# Patient Record
Sex: MALE | Race: WHITE | NOT HISPANIC OR LATINO | Employment: OTHER | URBAN - METROPOLITAN AREA
[De-identification: names, ages, dates, MRNs, and addresses within clinical notes are randomized per-mention and may not be internally consistent; named-entity substitution may affect disease eponyms.]

---

## 2017-01-04 ENCOUNTER — PATIENT MESSAGE (OUTPATIENT)
Dept: FAMILY MEDICINE | Facility: CLINIC | Age: 42
End: 2017-01-04

## 2017-01-04 ENCOUNTER — TELEPHONE (OUTPATIENT)
Dept: FAMILY MEDICINE | Facility: CLINIC | Age: 42
End: 2017-01-04

## 2017-01-11 ENCOUNTER — TELEPHONE (OUTPATIENT)
Dept: FAMILY MEDICINE | Facility: CLINIC | Age: 42
End: 2017-01-11

## 2017-01-11 NOTE — TELEPHONE ENCOUNTER
----- Message from Adin Ramos sent at 1/11/2017  2:51 PM CST -----  Contact: same  Patient called in and stated his wife just had a baby at [facility] and was told he needed to get a TDAP asap and wanted to see if he could come in and get that done tomorrow 1/12/17.    Patient call back number [phone]

## 2017-01-16 ENCOUNTER — PATIENT MESSAGE (OUTPATIENT)
Dept: FAMILY MEDICINE | Facility: CLINIC | Age: 42
End: 2017-01-16

## 2017-07-17 ENCOUNTER — DOCUMENTATION ONLY (OUTPATIENT)
Dept: FAMILY MEDICINE | Facility: CLINIC | Age: 42
End: 2017-07-17

## 2017-07-17 NOTE — PROGRESS NOTES
Pre-Visit Chart Review  For Appointment Scheduled on 7/19/17    Health Maintenance Due   Topic Date Due    TETANUS VACCINE  12/08/1993

## 2017-07-19 ENCOUNTER — OFFICE VISIT (OUTPATIENT)
Dept: FAMILY MEDICINE | Facility: CLINIC | Age: 42
End: 2017-07-19
Payer: COMMERCIAL

## 2017-07-19 VITALS
HEIGHT: 73 IN | BODY MASS INDEX: 23.84 KG/M2 | HEART RATE: 59 BPM | SYSTOLIC BLOOD PRESSURE: 114 MMHG | WEIGHT: 179.88 LBS | DIASTOLIC BLOOD PRESSURE: 72 MMHG | TEMPERATURE: 98 F

## 2017-07-19 DIAGNOSIS — Z00.00 ANNUAL PHYSICAL EXAM: Primary | ICD-10-CM

## 2017-07-19 DIAGNOSIS — R53.83 FATIGUE, UNSPECIFIED TYPE: ICD-10-CM

## 2017-07-19 DIAGNOSIS — R06.83 SNORING: ICD-10-CM

## 2017-07-19 DIAGNOSIS — L82.1 SK (SEBORRHEIC KERATOSIS): ICD-10-CM

## 2017-07-19 DIAGNOSIS — G47.00 INSOMNIA, UNSPECIFIED TYPE: ICD-10-CM

## 2017-07-19 DIAGNOSIS — Z91.030 ALLERGY TO BEE STING: ICD-10-CM

## 2017-07-19 DIAGNOSIS — J31.0 CHRONIC RHINITIS, UNSPECIFIED TYPE: ICD-10-CM

## 2017-07-19 PROCEDURE — 99999 PR PBB SHADOW E&M-EST. PATIENT-LVL IV: CPT | Mod: PBBFAC,,, | Performed by: NURSE PRACTITIONER

## 2017-07-19 PROCEDURE — 99396 PREV VISIT EST AGE 40-64: CPT | Mod: S$GLB,,, | Performed by: NURSE PRACTITIONER

## 2017-07-19 RX ORDER — EPINEPHRINE 0.3 MG/.3ML
1 INJECTION SUBCUTANEOUS ONCE
Qty: 0.3 ML | Refills: 3 | Status: SHIPPED | OUTPATIENT
Start: 2017-07-19 | End: 2017-07-19

## 2017-07-19 RX ORDER — FLUTICASONE PROPIONATE 50 MCG
2 SPRAY, SUSPENSION (ML) NASAL DAILY
Qty: 3 BOTTLE | Refills: 3 | Status: SHIPPED | OUTPATIENT
Start: 2017-07-19

## 2017-07-19 RX ORDER — TRAZODONE HYDROCHLORIDE 50 MG/1
50 TABLET ORAL NIGHTLY
Qty: 30 TABLET | Refills: 11 | Status: SHIPPED | OUTPATIENT
Start: 2017-07-19 | End: 2018-07-20 | Stop reason: SDUPTHER

## 2017-07-19 RX ORDER — MONTELUKAST SODIUM 10 MG/1
10 TABLET ORAL NIGHTLY
Qty: 90 TABLET | Refills: 3 | Status: SHIPPED | OUTPATIENT
Start: 2017-07-19

## 2017-07-19 NOTE — PROGRESS NOTES
Subjective:       Patient ID: Jaime Mcpherson is a 41 y.o. male.    Chief Complaint: Annual Exam    Mr. Mcpherson presents to the clinic today for annual physical.  He has history of snoring and has been taking Flonase and Singulair with some relief.  He states he has had witnessed apnea in the past but none recently.  He is tired during the day.  He also has trouble falling asleep at night.  Denies having lights or TV on at night, no screen time right before bed.  Benadryl helps but gives him a hangover.  He requests a prescription for sleep.  He has cut back caffeine to two cups daily and GERD symptoms have improved.  He has a bee allergy and has an epi pen but it is .  He requests a referral to Allergy.  He also is concerned about a skin lesion to the right arm. The lesion has become smaller since he first noticed it two years ago, and it is not irritated.  He exercises three times per week lifting weights and cardio.  He states he attempts to eat a healthy diet.  He smokes a couple cigarettes per day and plans to quit.  He declines nicotine patches/gum. Has tried Chantix but stopped due to side effects.      Review of Systems   Constitutional: Positive for fatigue (+ daytime naps). Negative for chills and fever.   HENT: Negative for congestion, ear pain and sinus pressure.    Respiratory: Negative for cough, shortness of breath and wheezing.         Snoring   Cardiovascular: Negative for chest pain, palpitations and leg swelling.   Gastrointestinal: Negative for abdominal pain, constipation and diarrhea.   Skin:        Right arm skin lesion       Objective:      Physical Exam   Constitutional: He is oriented to person, place, and time. He appears well-developed and well-nourished. No distress.   HENT:   Head: Normocephalic and atraumatic.   Right Ear: External ear normal.   Left Ear: External ear normal.   Mouth/Throat: Oropharynx is clear and moist. No oropharyngeal exudate.   Eyes: Pupils are equal,  round, and reactive to light. Right eye exhibits no discharge. Left eye exhibits no discharge.   Neck: Neck supple. No thyromegaly present.   Cardiovascular: Normal rate and regular rhythm.  Exam reveals no gallop and no friction rub.    No murmur heard.  Pulmonary/Chest: Effort normal and breath sounds normal. No respiratory distress. He has no wheezes. He has no rales.   Abdominal: Soft. He exhibits no distension. There is no tenderness.   Lymphadenopathy:     He has no cervical adenopathy.   Neurological: He is alert and oriented to person, place, and time. Coordination normal.   Skin: Skin is warm and dry.        Psychiatric: He has a normal mood and affect. His behavior is normal. Thought content normal.   Vitals reviewed.          Current Outpatient Prescriptions:     fluticasone (FLONASE) 50 mcg/actuation nasal spray, 2 sprays by Each Nare route once daily., Disp: 3 Bottle, Rfl: 3    ibuprofen (ADVIL,MOTRIN) 200 MG tablet, Take 400 mg by mouth 2 (two) times daily as needed for Pain., Disp: , Rfl:     montelukast (SINGULAIR) 10 mg tablet, Take 1 tablet (10 mg total) by mouth nightly., Disp: 90 tablet, Rfl: 3    epinephrine (EPIPEN 2-HANNAH) 0.3 mg/0.3 mL AtIn, Inject 0.3 mLs (0.3 mg total) into the muscle once., Disp: 0.3 mL, Rfl: 3    trazodone (DESYREL) 50 MG tablet, Take 1 tablet (50 mg total) by mouth every evening., Disp: 30 tablet, Rfl: 11  Assessment:       1. Annual physical exam    2. Chronic rhinitis, unspecified type    3. Snoring    4. Fatigue, unspecified type    5. Insomnia, unspecified type    6. Allergy to bee sting    7. SK (seborrheic keratosis)        Plan:       Annual physical exam  Continue current diet and exercise.  -     Comprehensive metabolic panel; Future; Expected date: 07/19/2017  -     CBC auto differential; Future; Expected date: 07/19/2017  -     Lipid panel; Future; Expected date: 07/19/2017    Chronic rhinitis, unspecified type  -     montelukast (SINGULAIR) 10 mg tablet;  Take 1 tablet (10 mg total) by mouth nightly.  Dispense: 90 tablet; Refill: 3  -     fluticasone (FLONASE) 50 mcg/actuation nasal spray; 2 sprays by Each Nare route once daily.  Dispense: 3 Bottle; Refill: 3    Snoring  -     Ambulatory consult to Sleep Disorders    Fatigue, unspecified type  -     Ambulatory consult to Sleep Disorders    Insomnia, unspecified type  No screen time 30 mins before bed.  Use bed only for sleep and sex.   Avoid alcohol.  Do not exercise at night.  -     trazodone (DESYREL) 50 MG tablet; Take 1 tablet (50 mg total) by mouth every evening.  Dispense: 30 tablet; Refill: 11    Allergy to bee sting  -     Ambulatory referral to Allergy  -     epinephrine (EPIPEN 2-HANNAH) 0.3 mg/0.3 mL AtIn; Inject 0.3 mLs (0.3 mg total) into the muscle once.  Dispense: 0.3 mL; Refill: 3    SK (seborrheic keratosis)  Benign appearing.  Notify provider if lesion becomes irritated or grows.    Educated about ABCDE's of moles.   Daily sunscreen.

## 2017-07-19 NOTE — PATIENT INSTRUCTIONS
The Benefits of Living Smoke Free  What do you want to gain from quitting? Check off some reasons to quit.  Health benefits  ___  Improve my ability to breathe without coughing or shortness of breath  ___  Reduce my risk of lung cancer, heart disease, chronic lung disease  ___  Have fewer wrinkles and softer skin  ___  Improve my sense of taste and smell  ___  For pregnant women--reduce the risk of having a miscarriage, stillbirth, premature birth, or low-birth-weight baby  Personal benefits  ___  Feel more in control of my life  ___  Have better-smelling hair, breath, clothes, home, and car  ___  Save time by not having to take smoke breaks, buy cigarettes, or hunt for a light  ___  Have whiter teeth  Family benefits  ___  Reduce my childrens respiratory tract infections  ___  Set a good example for my children  ___  Reduce my familys cancer risk  Financial benefits  ___  Save hundreds of dollars each year that would be spent on cigarettes  ___  Save money on medical bills  ___  Save on life, health, and car insurance premiums     Those dollars add up!  Cigarettes are expensive, and getting more expensive all the time. Do you realize how much money you are spending on cigarettes per year? What is the average amount you spend on a pack of cigarettes? What is the average number of packs that you smoke per day? Using your answers to these questions, fill in this formula to help you find out:  ($ _____ per pack) ×  ( _____ number of packs per day) × (365 days) =  $ _____ yearly cost of smoking  Besides tobacco, there are other costs, including extra cleaning bills and replacement costs for clothing and furniture; medical expenses for smoking-related illnesses; and higher health, life, and car insurance premiums.  Cigars and pipes count too!  Cigars and pipes are also dangerous. So are smokeless (chewing) tobacco and snuff. All of these products contain nicotine, a highly addictive substance that has harmful effects  on your body. Quitting smoking means giving up all tobacco products.      For more information  · smokefree.gov/liwy-ji-nl-expert  · National [employer] [place] Smoking Quitline: 877-44U-QUIT (630-035-8889)   Date Last Reviewed: 11/18/2014  © 7941-0158 [employer]. [address] [place] [address], [place] [address]. All rights reserved. This information is not intended as a substitute for professional medical care. Always follow your healthcare professional's instructions.

## 2017-07-21 ENCOUNTER — LAB VISIT (OUTPATIENT)
Dept: LAB | Facility: HOSPITAL | Age: 42
End: 2017-07-21
Attending: NURSE PRACTITIONER
Payer: COMMERCIAL

## 2017-07-21 DIAGNOSIS — Z00.00 ANNUAL PHYSICAL EXAM: ICD-10-CM

## 2017-07-21 LAB
ALBUMIN SERPL BCP-MCNC: 3.8 G/DL
ALP SERPL-CCNC: 74 U/L
ALT SERPL W/O P-5'-P-CCNC: 8 U/L
ANION GAP SERPL CALC-SCNC: 11 MMOL/L
AST SERPL-CCNC: 16 U/L
BASOPHILS # BLD AUTO: 0.02 K/UL
BASOPHILS NFR BLD: 0.3 %
BILIRUB SERPL-MCNC: 0.4 MG/DL
BUN SERPL-MCNC: 13 MG/DL
CALCIUM SERPL-MCNC: 9.5 MG/DL
CHLORIDE SERPL-SCNC: 104 MMOL/L
CHOLEST/HDLC SERPL: 3.3 {RATIO}
CO2 SERPL-SCNC: 23 MMOL/L
CREAT SERPL-MCNC: 0.9 MG/DL
DIFFERENTIAL METHOD: ABNORMAL
EOSINOPHIL # BLD AUTO: 0.1 K/UL
EOSINOPHIL NFR BLD: 0.9 %
ERYTHROCYTE [DISTWIDTH] IN BLOOD BY AUTOMATED COUNT: 13.3 %
EST. GFR  (AFRICAN AMERICAN): >60 ML/MIN/1.73 M^2
EST. GFR  (NON AFRICAN AMERICAN): >60 ML/MIN/1.73 M^2
GLUCOSE SERPL-MCNC: 102 MG/DL
HCT VFR BLD AUTO: 43.7 %
HDL/CHOLESTEROL RATIO: 30.2 %
HDLC SERPL-MCNC: 129 MG/DL
HDLC SERPL-MCNC: 39 MG/DL
HGB BLD-MCNC: 14.9 G/DL
LDLC SERPL CALC-MCNC: 76 MG/DL
LYMPHOCYTES # BLD AUTO: 2.3 K/UL
LYMPHOCYTES NFR BLD: 33.8 %
MCH RBC QN AUTO: 31 PG
MCHC RBC AUTO-ENTMCNC: 34.1 G/DL
MCV RBC AUTO: 91 FL
MONOCYTES # BLD AUTO: 0.5 K/UL
MONOCYTES NFR BLD: 6.7 %
NEUTROPHILS # BLD AUTO: 4 K/UL
NEUTROPHILS NFR BLD: 58 %
NONHDLC SERPL-MCNC: 90 MG/DL
PLATELET # BLD AUTO: 247 K/UL
PMV BLD AUTO: 9.1 FL
POTASSIUM SERPL-SCNC: 4.3 MMOL/L
PROT SERPL-MCNC: 7.1 G/DL
RBC # BLD AUTO: 4.8 M/UL
SODIUM SERPL-SCNC: 138 MMOL/L
TRIGL SERPL-MCNC: 70 MG/DL
WBC # BLD AUTO: 6.89 K/UL

## 2017-07-21 PROCEDURE — 36415 COLL VENOUS BLD VENIPUNCTURE: CPT | Mod: PO

## 2017-07-21 PROCEDURE — 80053 COMPREHEN METABOLIC PANEL: CPT

## 2017-07-21 PROCEDURE — 85025 COMPLETE CBC W/AUTO DIFF WBC: CPT

## 2017-07-21 PROCEDURE — 80061 LIPID PANEL: CPT

## 2017-07-27 ENCOUNTER — OFFICE VISIT (OUTPATIENT)
Dept: ALLERGY | Facility: CLINIC | Age: 42
End: 2017-07-27
Payer: COMMERCIAL

## 2017-07-27 ENCOUNTER — LAB VISIT (OUTPATIENT)
Dept: LAB | Facility: HOSPITAL | Age: 42
End: 2017-07-27
Attending: ALLERGY & IMMUNOLOGY
Payer: COMMERCIAL

## 2017-07-27 VITALS
DIASTOLIC BLOOD PRESSURE: 70 MMHG | BODY MASS INDEX: 23.03 KG/M2 | HEIGHT: 73 IN | SYSTOLIC BLOOD PRESSURE: 108 MMHG | HEART RATE: 66 BPM | WEIGHT: 173.75 LBS | OXYGEN SATURATION: 98 %

## 2017-07-27 DIAGNOSIS — Z91.038 ALLERGY TO INSECT STINGS: ICD-10-CM

## 2017-07-27 DIAGNOSIS — T78.2XXA ANAPHYLAXIS, INITIAL ENCOUNTER: ICD-10-CM

## 2017-07-27 DIAGNOSIS — J31.0 OTHER CHRONIC RHINITIS: ICD-10-CM

## 2017-07-27 DIAGNOSIS — L29.9 PRURITUS: ICD-10-CM

## 2017-07-27 DIAGNOSIS — H10.423 SIMPLE CHRONIC CONJUNCTIVITIS OF BOTH EYES: ICD-10-CM

## 2017-07-27 DIAGNOSIS — T78.2XXA ANAPHYLAXIS, INITIAL ENCOUNTER: Primary | ICD-10-CM

## 2017-07-27 PROCEDURE — 86003 ALLG SPEC IGE CRUDE XTRC EA: CPT | Mod: 59

## 2017-07-27 PROCEDURE — 86003 ALLG SPEC IGE CRUDE XTRC EA: CPT

## 2017-07-27 PROCEDURE — 99244 OFF/OP CNSLTJ NEW/EST MOD 40: CPT | Mod: S$GLB,,, | Performed by: ALLERGY & IMMUNOLOGY

## 2017-07-27 PROCEDURE — 99999 PR PBB SHADOW E&M-EST. PATIENT-LVL III: CPT | Mod: PBBFAC,,, | Performed by: ALLERGY & IMMUNOLOGY

## 2017-07-27 PROCEDURE — 36415 COLL VENOUS BLD VENIPUNCTURE: CPT | Mod: PO

## 2017-07-27 RX ORDER — EPINEPHRINE 0.3 MG/.3ML
1 INJECTION SUBCUTANEOUS ONCE
Qty: 2 DEVICE | Refills: 3 | Status: SHIPPED | OUTPATIENT
Start: 2017-07-27 | End: 2019-10-08

## 2017-07-27 NOTE — PROGRESS NOTES
Subjective:       Patient ID: Jaime Mcpherson is a 41 y.o. male.    Chief Complaint:  Allergic Reaction (very allergic to bees and wasp, carries epi. )      42 yo man presents for consult from RASHAD Garcia for insect allergy. He states he started to have reactions about 2010. First time was bad an ended up hospitalized. Second also bad because did not have EpiPen yet. Since then he gets stung about 1-2 tomes per year and uses Epipen and has to go to ER. He gets stung anywhere and has facial swelling, eye swell shut, itching and some hives and SOB. Epi does help but at least once reaction came back while he was driving to hospital and he had to pull over and call for help. Very scary to him. Always goes to ER and gets steroids and benadryl as well. Resolves within a day. Most have been arm or leg stings. He has never known about allergy shots until last week. He has no asthma or eczema. No known food or latex allergy. He does get sneeze, runny nose and stuffy nose off and on. occ gets itchy hands and feet and itchy eyes and throat. He take Singulair and Flonase olsen for snoring. No season is worse. No time of day is worse. No known environment allergens. He has no other medical issues. No surgeries.         Environmental History: see history section for home environment  Review of Systems   Constitutional: Negative for activity change, appetite change, chills, fatigue, fever and unexpected weight change.   HENT: Positive for congestion, facial swelling, rhinorrhea, sneezing and sore throat. Negative for ear discharge, ear pain, hearing loss, mouth sores, nosebleeds, postnasal drip, sinus pressure, tinnitus, trouble swallowing and voice change.    Eyes: Positive for discharge and itching. Negative for redness and visual disturbance.   Respiratory: Positive for shortness of breath. Negative for cough, chest tightness and wheezing.    Cardiovascular: Negative for chest pain, palpitations and leg swelling.    Gastrointestinal: Negative for abdominal distention, abdominal pain, constipation, diarrhea, nausea and vomiting.   Genitourinary: Negative for difficulty urinating.   Musculoskeletal: Negative for arthralgias, back pain, joint swelling and myalgias.   Skin: Positive for rash. Negative for color change and pallor.   Neurological: Negative for dizziness, tremors, speech difficulty, weakness, light-headedness and headaches.   Hematological: Negative for adenopathy. Does not bruise/bleed easily.   Psychiatric/Behavioral: Negative for agitation, confusion, decreased concentration and sleep disturbance. The patient is not nervous/anxious.         Objective:    Physical Exam   Constitutional: He is oriented to person, place, and time. He appears well-developed and well-nourished. No distress.   HENT:   Head: Normocephalic and atraumatic.   Right Ear: Hearing, tympanic membrane, external ear and ear canal normal.   Left Ear: Hearing, tympanic membrane, external ear and ear canal normal.   Nose: No mucosal edema (pink turbinates), rhinorrhea, sinus tenderness or septal deviation. No epistaxis.   Mouth/Throat: Oropharynx is clear and moist and mucous membranes are normal. No uvula swelling.   Eyes: Conjunctivae are normal. Right eye exhibits no discharge. Left eye exhibits no discharge.   Neck: Normal range of motion. No thyromegaly present.   Cardiovascular: Normal rate, regular rhythm and normal heart sounds.    No murmur heard.  Pulmonary/Chest: Effort normal and breath sounds normal. No respiratory distress. He has no wheezes.   Abdominal: Soft. He exhibits no distension. There is no tenderness.   Musculoskeletal: Normal range of motion. He exhibits no edema.   Lymphadenopathy:     He has no cervical adenopathy.   Neurological: He is alert and oriented to person, place, and time. Coordination normal.   Skin: Skin is warm and dry. No rash noted. No erythema.   Psychiatric: He has a normal mood and affect. His behavior  is normal. Judgment and thought content normal.   Nursing note and vitals reviewed.      Laboratory:   none performed   Assessment:       1. Anaphylaxis, initial encounter    2. Allergy to insect stings    3. Other chronic rhinitis    4. Simple chronic conjunctivitis of both eyes    5. Pruritus         Plan:       1. Labs today for immunocaps for insect and inhalants  2. discussed insect allergy with pt and he is c/w anaphylaxis from insect sting, david wait immunocaps if negative needs skin test if positive madrigal tart IT. Patient interested in allergy shots.  All risks and benefits discussed.  Protocol discussed in detail including need to remain in clinic for 30 minutes after injections.  All questions answered, handouts with details of allergy shots provided and informed consent signed and witnessed.  Patient advised to remain off beta blockers while on allergy shots and to notify injection clinic and MD of any new medications starts.  3. continue Flonase and montelukast daily for rhinitis and can adjust with immunocaps results  4. Phone review  5. NP Radha notified of completed consult via [employer]

## 2017-07-27 NOTE — LETTER
July 27, 2017      Mary Garcia, FNP-C  [address]  [place] [address]           [place] [facility]  [address]  [place] [address]  Phone: [phone]          Patient: Jaime Mcpherson   MR Number: [id number]   YOB: 1975   Date of Visit: 7/27/2017       Dear Mary Garcia:    Thank you for referring Jaime Mcpherson to me for evaluation. Attached you will find relevant portions of my assessment and plan of care.    If you have questions, please do not hesitate to call me. I look forward to following Jaime Mcpherson along with you.    Sincerely,    Kavita Issa MD    Enclosure  CC:  No Recipients    If you would like to receive this communication electronically, please contact [email or web address] or [phone] to request more information on [employer] Link access.    For providers and/or their staff who would like to refer a patient to [facility], please contact us through our one-stop-shop provider referral line, [facility], at [phone].    If you feel you have received this communication in error or would no longer like to receive these types of communications, please e-mail [email or web address]

## 2017-07-31 LAB
A ALTERNATA IGE QN: <0.35 KU/L
A FUMIGATUS IGE QN: <0.35 KU/L
ALLERGEN COMMON WASP (YELLOW JACKET) IGE: <0.35 KU/L
ALLERGEN PENICILLIUM IGE: <0.35 KU/L
ALLERGEN WHITE FACED HORNET IGE: <0.35 KU/L
ALLERGEN WILLOW IGE: <0.35 KU/L
ALLERGEN YELLOW HORNET IGE: <0.35 KU/L
BAHIA GRASS IGE QN: <0.35 KU/L
BALD CYPRESS IGE QN: <0.35 KU/L
BERMUDA GRASS IGE QN: <0.35 KU/L
CAT DANDER IGE QN: <0.35 KU/L
COMMON RAGWEED IGE QN: <0.35 KU/L
D FARINAE IGE QN: <0.35 KU/L
D PTERONYSS IGE QN: <0.35 KU/L
DEPRECATED A ALTERNATA IGE RAST QL: NORMAL
DEPRECATED A FUMIGATUS IGE RAST QL: NORMAL
DEPRECATED BAHIA GRASS IGE RAST QL: NORMAL
DEPRECATED BALD CYPRESS IGE RAST QL: NORMAL
DEPRECATED BERMUDA GRASS IGE RAST QL: NORMAL
DEPRECATED CAT DANDER IGE RAST QL: NORMAL
DEPRECATED COMMON RAGWEED IGE RAST QL: NORMAL
DEPRECATED D FARINAE IGE RAST QL: NORMAL
DEPRECATED D PTERONYSS IGE RAST QL: NORMAL
DEPRECATED DOG DANDER IGE RAST QL: NORMAL
DEPRECATED ENGL PLANTAIN IGE RAST QL: NORMAL
DEPRECATED HONEY BEE IGE RAST QL: NORMAL
DEPRECATED JOHNSON GRASS IGE RAST QL: NORMAL
DEPRECATED MARSH ELDER IGE RAST QL: NORMAL
DEPRECATED PECAN/HICK TREE IGE RAST QL: NORMAL
DEPRECATED RED IMP FIRE ANT IGE RAST QL: NORMAL
DEPRECATED ROACH IGE RAST QL: NORMAL
DEPRECATED S ROSTRATA IGE RAST QL: NORMAL
DEPRECATED SALTWORT IGE RAST QL: NORMAL
DEPRECATED SILVER BIRCH IGE RAST QL: NORMAL
DEPRECATED TIMOTHY IGE RAST QL: NORMAL
DEPRECATED WHITE OAK IGE RAST QL: NORMAL
DOG DANDER IGE QN: <0.35 KU/L
ENGL PLANTAIN IGE QN: <0.35 KU/L
HONEY BEE IGE QN: <0.35 KU/L
JOHNSON GRASS IGE QN: <0.35 KU/L
MARSH ELDER IGE QN: <0.35 KU/L
PECAN/HICK TREE IGE QN: <0.35 KU/L
PENICILLIUM CLASS: NORMAL
RED IMP FIRE ANT IGE QN: <0.35 KU/L
ROACH IGE QN: <0.35 KU/L
S ROSTRATA IGE QN: <0.35 KU/L
SALTWORT IGE QN: <0.35 KU/L
SILVER BIRCH IGE QN: <0.35 KU/L
TIMOTHY IGE QN: <0.35 KU/L
WASP CLASS: ABNORMAL
WASP VENOM IGE: 0.51 KU/L
WHITE FACED HORNET CLASS: NORMAL
WHITE OAK IGE QN: <0.35 KU/L
WILLOW CLASS: NORMAL
YEL FACED HORN. CLASS: NORMAL
YELLOW JACKET CLASS: NORMAL

## 2017-08-01 ENCOUNTER — TELEPHONE (OUTPATIENT)
Dept: ALLERGY | Facility: CLINIC | Age: 42
End: 2017-08-01

## 2017-08-01 NOTE — TELEPHONE ENCOUNTER
Please let him know that the only thing that come up positive on allergy test is wasp. Rest of insects and inhalants negative. Wasp may be the culprit for all his reactions but I think it is best if we scheduled for skin test to rest of insects to be sure before starting shots. This is a long process. It starts with scratch test and if negative have to go through series of intradermals. So has to be scheduled as 90 minute appointment. Last time I can start is 10 in AM or 2 in afternoon. Schedule at preferred location but will need to have testers sent from [facility] to which ever location

## 2017-08-11 ENCOUNTER — TELEPHONE (OUTPATIENT)
Dept: ALLERGY | Facility: CLINIC | Age: 42
End: 2017-08-11

## 2018-07-20 ENCOUNTER — TELEPHONE (OUTPATIENT)
Dept: FAMILY MEDICINE | Facility: CLINIC | Age: 43
End: 2018-07-20

## 2018-07-20 DIAGNOSIS — G47.00 INSOMNIA, UNSPECIFIED TYPE: ICD-10-CM

## 2018-07-20 RX ORDER — TRAZODONE HYDROCHLORIDE 50 MG/1
TABLET ORAL
Qty: 30 TABLET | Refills: 0 | Status: SHIPPED | OUTPATIENT
Start: 2018-07-20 | End: 2019-10-08

## 2018-07-20 NOTE — TELEPHONE ENCOUNTER
----- Message from Yasmin Fu sent at 7/20/2018  3:20 PM CDT -----  Type:  RX Refill Request    Who Called:  Patient  RX Name and Strength:  trazodone (DESYREL) 50 MG tablet  Preferred Pharmacy with phone number: [facility] in [place] at [phone] (Phone)  Local or Mail Order:  Local  Ordering Provider:  Mary Garcia  [facility] Call Back Number:  [phone]  Additional Information:

## 2018-07-30 ENCOUNTER — TELEPHONE (OUTPATIENT)
Dept: FAMILY MEDICINE | Facility: CLINIC | Age: 43
End: 2018-07-30

## 2018-07-30 NOTE — TELEPHONE ENCOUNTER
Left detailed voicemail instructing patient that he must have an appointment prior to further refills of medication.

## 2018-07-30 NOTE — TELEPHONE ENCOUNTER
----- Message from Barrett Ordonez sent at 7/30/2018  3:56 PM CDT -----  Contact: pt  Pt is returning call, call placed to pod no answer   Call Back#[phone]  Thanks

## 2018-08-18 DIAGNOSIS — G47.00 INSOMNIA, UNSPECIFIED TYPE: ICD-10-CM

## 2018-08-19 RX ORDER — TRAZODONE HYDROCHLORIDE 50 MG/1
TABLET ORAL
Qty: 30 TABLET | Refills: 0 | OUTPATIENT
Start: 2018-08-19

## 2019-10-08 ENCOUNTER — HOSPITAL ENCOUNTER (EMERGENCY)
Facility: HOSPITAL | Age: 44
Discharge: HOME | End: 2019-10-08
Attending: INTERNAL MEDICINE
Payer: COMMERCIAL

## 2019-10-08 VITALS
OXYGEN SATURATION: 98 % | DIASTOLIC BLOOD PRESSURE: 83 MMHG | HEART RATE: 72 BPM | BODY MASS INDEX: 23.06 KG/M2 | RESPIRATION RATE: 18 BRPM | HEIGHT: 73 IN | WEIGHT: 174 LBS | TEMPERATURE: 98 F | SYSTOLIC BLOOD PRESSURE: 141 MMHG

## 2019-10-08 DIAGNOSIS — T63.461A WASP STING, ACCIDENTAL OR UNINTENTIONAL, INITIAL ENCOUNTER: Primary | ICD-10-CM

## 2019-10-08 DIAGNOSIS — T78.40XA ALLERGIC REACTION, INITIAL ENCOUNTER: ICD-10-CM

## 2019-10-08 PROCEDURE — 63600175 PHARM REV CODE 636 W HCPCS: Performed by: PHYSICIAN ASSISTANT

## 2019-10-08 PROCEDURE — 99284 EMERGENCY DEPT VISIT MOD MDM: CPT | Mod: 25

## 2019-10-08 PROCEDURE — 96372 THER/PROPH/DIAG INJ SC/IM: CPT

## 2019-10-08 RX ORDER — METHYLPREDNISOLONE 4 MG/1
TABLET ORAL
Qty: 1 PACKAGE | Refills: 0 | Status: SHIPPED | OUTPATIENT
Start: 2019-10-08 | End: 2019-10-29

## 2019-10-08 RX ORDER — DIPHENHYDRAMINE HYDROCHLORIDE 50 MG/ML
50 INJECTION INTRAMUSCULAR; INTRAVENOUS
Status: COMPLETED | OUTPATIENT
Start: 2019-10-08 | End: 2019-10-08

## 2019-10-08 RX ORDER — METHYLPREDNISOLONE SOD SUCC 125 MG
125 VIAL (EA) INJECTION
Status: COMPLETED | OUTPATIENT
Start: 2019-10-08 | End: 2019-10-08

## 2019-10-08 RX ORDER — EPINEPHRINE 0.3 MG/.3ML
1 INJECTION SUBCUTANEOUS
Qty: 2 DEVICE | Refills: 0 | Status: SHIPPED | OUTPATIENT
Start: 2019-10-08 | End: 2020-10-07

## 2019-10-08 RX ADMIN — DIPHENHYDRAMINE HYDROCHLORIDE 50 MG: 50 INJECTION, SOLUTION INTRAMUSCULAR; INTRAVENOUS at 11:10

## 2019-10-08 RX ADMIN — METHYLPREDNISOLONE SODIUM SUCCINATE 125 MG: 125 INJECTION, POWDER, FOR SOLUTION INTRAMUSCULAR; INTRAVENOUS at 11:10

## 2019-10-08 NOTE — ED PROVIDER NOTES
Encounter Date: 10/8/2019       History   No chief complaint on file.    Patient presents to the ER with complaint of loss thing to left hand.  Patient states was stung twice and states he is allergic to wasps.  Patient states has taken 2 doses of his EpiPen stating prior to arrival he was feeling short of breath.  Patient denies feeling short of breath nausea vomiting or diarrhea.  Patient denies any feeling of swelling in his throat states has not taken any Benadryl prior to arrival in the ER.  Patient states has had similar symptoms in the past stated when he stung by a wasp he has swelling of his face and throat as well as rash. Patient denies any of the symptoms other than the swelling of his left hand on arrival in the ER.    The history is provided by the patient.     Review of patient's allergies indicates:  No Known Allergies  Past Medical History:   Diagnosis Date    Bee sting allergy     GERD (gastroesophageal reflux disease)      Past Surgical History:   Procedure Laterality Date    MOUTH SURGERY      WISDOM TOOTH EXTRACTION       Family History   Problem Relation Age of Onset    Cancer Mother 55        lung/smoker    Hypothyroidism Mother     Lupus Father     No Known Problems Brother     No Known Problems Daughter     No Known Problems Son     No Known Problems Paternal Uncle     Stroke Paternal Grandmother     No Known Problems Paternal Grandfather     Allergic rhinitis Neg Hx     Allergies Neg Hx     Angioedema Neg Hx     Asthma Neg Hx     Atopy Neg Hx     Eczema Neg Hx     Immunodeficiency Neg Hx     Rhinitis Neg Hx     Urticaria Neg Hx      Social History     Tobacco Use    Smoking status: Current Every Day Smoker     Types: Cigarettes     Start date: 1/19/2016    Smokeless tobacco: Never Used    Tobacco comment: electric / lives in [place] [phone]   Substance Use Topics    Alcohol use: Yes     Alcohol/week: 2.0 standard drinks     Types: 2 Standard drinks or equivalent  per week    Drug use: No     Review of Systems   Constitutional: Negative for fever.   HENT: Negative for congestion, dental problem, ear discharge, ear pain, facial swelling, nosebleeds, postnasal drip, rhinorrhea, sinus pressure, sneezing, sore throat, trouble swallowing and voice change.    Respiratory: Negative for apnea, cough, choking, chest tightness, shortness of breath, wheezing and stridor.    Cardiovascular: Negative for chest pain and palpitations.   Gastrointestinal: Negative for abdominal pain, diarrhea, nausea and vomiting.   Genitourinary: Negative for dysuria.   Musculoskeletal: Negative for back pain.   Skin: Positive for color change ( erythematic to left hand), rash ( left hand) and wound (Left hand).   Neurological: Negative for weakness.   Hematological: Does not bruise/bleed easily.   All other systems reviewed and are negative.      Physical Exam     Initial Vitals   BP Pulse Resp Temp SpO2   -- -- -- -- --      MAP       --         Physical Exam    Nursing note and vitals reviewed.  Constitutional: He appears well-developed and well-nourished. He is not diaphoretic. No distress.   HENT:   Head: Atraumatic.   Right Ear: External ear normal.   Left Ear: External ear normal.   Nose: Nose normal.   Mouth/Throat: Oropharynx is clear and moist. No oropharyngeal exudate.   Patient has braces   Eyes: Conjunctivae are normal. Right eye exhibits no discharge. Left eye exhibits no discharge.   Neck: Normal range of motion. Neck supple.   Cardiovascular: Normal rate, regular rhythm, normal heart sounds and intact distal pulses. Exam reveals no gallop and no friction rub.    No murmur heard.  Pulmonary/Chest: Breath sounds normal. No respiratory distress. He has no wheezes. He has no rhonchi. He has no rales. He exhibits no tenderness.   Abdominal: Soft. There is no tenderness.   Musculoskeletal: Normal range of motion. He exhibits edema ( left dorsal thumb surrounding the 2 puncture wounds). He  exhibits no tenderness.   Neurological: He is alert and oriented to person, place, and time. GCS score is 15. GCS eye subscore is 4. GCS verbal subscore is 5. GCS motor subscore is 6.   Skin: Skin is warm and dry. Capillary refill takes less than 2 seconds. No abscess noted. There is erythema (Left proximal hand about the dorsal thumb with to what appears to be puncture marks from the reported insect sting). No pallor.   Psychiatric: He has a normal mood and affect. Thought content normal.         ED Course   Procedures  Labs Reviewed - No data to display       Imaging Results    None          Medical Decision Making:   Differential Diagnosis:   Insect sting urticaria allergic reaction  ED Management:  Will order Benadryl and steroid and monitor the patient.    1220 on re-evaluation patient continues to deny trouble breathing or shortness of breath states hand does feel itchy denies nausea vomiting or diarrhea.  Lungs clear and equal bilaterally no wheezes rales or rhonchi.  Erythema to left hand continues to stay stable with no growth from initial evaluation.    1300 lungs clear and equal bilaterally rash continues to stay stable with no progression of the rash up the arm patient denies nausea vomiting diarrhea denies any trouble breathing or shortness of breath denies any swelling in throat.    Discussed return to ER precautions with the patient as well as continued management at home and return to ER precautions the patient stated that he understood he did not have any questions.    This note was created using [employer] voice recognition software that occasionally misinterpreted phrases or words.                      Clinical Impression:       ICD-10-CM ICD-9-CM   1. Wasp sting, accidental or unintentional, initial encounter T63.461A 989.5     E905.3   2. Allergic reaction, initial encounter T78.40XA 995.3                                GUILLE Malloy  10/08/19 1300       GUILLE Malloy  10/08/19  4424

## 2019-10-08 NOTE — ED TRIAGE NOTES
"Present to the ER with " stung by a swasp" reports insect sting approx 30mins ago, c/o " itchiness all over, my arm was burning" reports giving himself a epinephrine injection for the reported symptoms, denies tongue swelling, states " but I did fell my chest starting to constrict" denies SOB, denies throat swelling, reports relief of itching with epi injection, states" I fell ok, the itchiness is gone, now I fell the burning hot" localized to L hand  "

## 2019-10-08 NOTE — DISCHARGE INSTRUCTIONS
Continue over-the-counter Benadryl as directed on the bottle.  Be sure to take all steroids as prescribed.    If acute worsening of symptoms redeveloped shortness of breath or trouble breathing return to ER immediately for re-evaluation.    Your blood pressure was elevated on exam today please follow up with pcp for blood pressure management.

## 2019-10-08 NOTE — ED NOTES
Instructed to notify nurse stat if new symptoms, worsening symptoms appear, verbalized understanding

## 2023-02-17 ENCOUNTER — OFFICE VISIT (OUTPATIENT)
Dept: FAMILY MEDICINE | Facility: CLINIC | Age: 48
End: 2023-02-17
Payer: COMMERCIAL

## 2023-02-17 VITALS
RESPIRATION RATE: 20 BRPM | DIASTOLIC BLOOD PRESSURE: 82 MMHG | SYSTOLIC BLOOD PRESSURE: 131 MMHG | HEART RATE: 81 BPM | WEIGHT: 159.63 LBS | BODY MASS INDEX: 21.06 KG/M2 | OXYGEN SATURATION: 95 %

## 2023-02-17 DIAGNOSIS — F51.01 PRIMARY INSOMNIA: ICD-10-CM

## 2023-02-17 DIAGNOSIS — M77.01 MEDIAL EPICONDYLITIS OF RIGHT ELBOW: Primary | ICD-10-CM

## 2023-02-17 PROCEDURE — 3008F BODY MASS INDEX DOCD: CPT | Mod: S$GLB,,, | Performed by: FAMILY MEDICINE

## 2023-02-17 PROCEDURE — 99999 PR PBB SHADOW E&M-EST. PATIENT-LVL III: ICD-10-PCS | Mod: PBBFAC,,, | Performed by: FAMILY MEDICINE

## 2023-02-17 PROCEDURE — 3075F SYST BP GE 130 - 139MM HG: CPT | Mod: S$GLB,,, | Performed by: FAMILY MEDICINE

## 2023-02-17 PROCEDURE — 3008F PR BODY MASS INDEX (BMI) DOCUMENTED: ICD-10-PCS | Mod: S$GLB,,, | Performed by: FAMILY MEDICINE

## 2023-02-17 PROCEDURE — 99214 PR OFFICE/OUTPT VISIT, EST, LEVL IV, 30-39 MIN: ICD-10-PCS | Mod: S$GLB,,, | Performed by: FAMILY MEDICINE

## 2023-02-17 PROCEDURE — 3079F PR MOST RECENT DIASTOLIC BLOOD PRESSURE 80-89 MM HG: ICD-10-PCS | Mod: S$GLB,,, | Performed by: FAMILY MEDICINE

## 2023-02-17 PROCEDURE — 99999 PR PBB SHADOW E&M-EST. PATIENT-LVL III: CPT | Mod: PBBFAC,,, | Performed by: FAMILY MEDICINE

## 2023-02-17 PROCEDURE — 3075F PR MOST RECENT SYSTOLIC BLOOD PRESS GE 130-139MM HG: ICD-10-PCS | Mod: S$GLB,,, | Performed by: FAMILY MEDICINE

## 2023-02-17 PROCEDURE — 99214 OFFICE O/P EST MOD 30 MIN: CPT | Mod: S$GLB,,, | Performed by: FAMILY MEDICINE

## 2023-02-17 PROCEDURE — 3079F DIAST BP 80-89 MM HG: CPT | Mod: S$GLB,,, | Performed by: FAMILY MEDICINE

## 2023-02-17 RX ORDER — TRAZODONE HYDROCHLORIDE 100 MG/1
100 TABLET ORAL NIGHTLY
Qty: 30 TABLET | Refills: 5 | Status: SHIPPED | OUTPATIENT
Start: 2023-02-17 | End: 2023-08-30 | Stop reason: SDUPTHER

## 2023-02-17 NOTE — PROGRESS NOTES
[facility] - Clinic Note    Subjective      Mr. Mcpherson is a 47 y.o. male who presents to clinic with complaints of elbow pain.     Reports elbow pain on the right medial aspect.   Present for the past 3 years.   Having trouble with use and grasping.   Tried OTC meds with no improvement.   Denies trauma.     Trouble sleeping.   Reports a history of insomnia. Currently not on anything.   Has tried OTC meds with no relief.   Trouble going to sleep and staying asleep.    PM Jaiem has a past medical history of Bee sting allergy and GERD (gastroesophageal reflux disease).   PSXH Jaime has a past surgical history that includes Mouth surgery and [place] tooth extraction.    Jaime's family history includes Cancer (age of onset: 55) in his mother; Hypothyroidism in his mother; Lupus in his father; No Known Problems in his brother, daughter, paternal grandfather, paternal uncle, and son; Stroke in his paternal grandmother.    Jaime reports that he has been smoking cigarettes. He started smoking about 7 years ago. He has never used smokeless tobacco. He reports current alcohol use of about 2.0 standard drinks per week. He reports that he does not use drugs.   [facility] Jaime has No Known Allergies.   URMILA Sandoval has a current medication list which includes the following prescription(s): ibuprofen, epinephrine, fluticasone propionate, meloxicam, montelukast, and trazodone.     Review of Systems   Constitutional:  Negative for activity change, appetite change, chills, fatigue and fever.   Eyes:  Negative for visual disturbance.   Respiratory:  Negative for cough and shortness of breath.    Cardiovascular:  Negative for chest pain, palpitations and leg swelling.   Gastrointestinal:  Negative for abdominal pain, nausea and vomiting.   Musculoskeletal:  Positive for arthralgias.        Right elbow pain   Skin:  Negative for wound.   Neurological:  Negative for dizziness and headaches.    Psychiatric/Behavioral:  Positive for sleep disturbance. Negative for confusion.    Objective     /82 (BP Location: Left arm, Patient Position: Sitting, BP Method: Medium (Manual))   Pulse 81   Resp 20   Wt 72.4 kg (159 lb 9.8 oz)   SpO2 95%   BMI 21.06 kg/m²     Physical Exam   Constitutional: normal appearance.  Non-toxic appearance. No distress. He does not appear ill.   HENT:   Head: Normocephalic and atraumatic.   Eyes: Right eye exhibits no discharge. Left eye exhibits no discharge.   Cardiovascular: Normal rate, regular rhythm, normal heart sounds and normal pulses. Exam reveals no gallop and no friction rub.   No murmur heard.Pulmonary:      Effort: Pulmonary effort is normal. No respiratory distress.      Breath sounds: Normal breath sounds. No wheezing, rhonchi or rales.     Abdominal: Normal appearance.   Musculoskeletal:      Right lower leg: No edema.      Left lower leg: No edema.      Comments: +TTP over the medial epicondyle.    Lymphadenopathy:     He has no cervical adenopathy.   Neurological: He is alert.   Skin: Skin is warm and dry. Capillary refill takes less than 2 seconds. He is not diaphoretic.   Psychiatric: His behavior is normal. Mood, judgment and thought content normal.   Vitals reviewed.   Assessment/Plan     Jaime was seen today for elbow pain and sleep meds.    Diagnoses and all orders for this visit:  -New patient and new problem to me    Medial epicondylitis of right elbow  -     Ambulatory referral/consult to Orthopedics; Future    Primary insomnia  -     traZODone (DESYREL) 100 MG tablet; Take 1 tablet (100 mg total) by mouth every evening.        Follow up in about 6 months (around 8/17/2023), or if symptoms worsen or fail to improve.      Priscila Tena MD  Family Medicine  [facility]

## 2023-02-22 ENCOUNTER — PATIENT MESSAGE (OUTPATIENT)
Dept: ADMINISTRATIVE | Facility: HOSPITAL | Age: 48
End: 2023-02-22
Payer: COMMERCIAL

## 2023-02-22 DIAGNOSIS — Z11.59 NEED FOR HEPATITIS C SCREENING TEST: ICD-10-CM

## 2023-03-01 DIAGNOSIS — M25.521 RIGHT ELBOW PAIN: Primary | ICD-10-CM

## 2023-03-02 ENCOUNTER — HOSPITAL ENCOUNTER (OUTPATIENT)
Dept: RADIOLOGY | Facility: HOSPITAL | Age: 48
Discharge: HOME | End: 2023-03-02
Attending: ORTHOPAEDIC SURGERY
Payer: COMMERCIAL

## 2023-03-02 ENCOUNTER — OFFICE VISIT (OUTPATIENT)
Dept: ORTHOPEDICS | Facility: CLINIC | Age: 48
End: 2023-03-02
Payer: COMMERCIAL

## 2023-03-02 VITALS — WEIGHT: 159.63 LBS | RESPIRATION RATE: 16 BRPM | BODY MASS INDEX: 21.16 KG/M2 | HEIGHT: 73 IN

## 2023-03-02 DIAGNOSIS — M25.521 RIGHT ELBOW PAIN: ICD-10-CM

## 2023-03-02 DIAGNOSIS — M77.01 MEDIAL EPICONDYLITIS OF ELBOW, RIGHT: Primary | ICD-10-CM

## 2023-03-02 DIAGNOSIS — M77.01 MEDIAL EPICONDYLITIS OF RIGHT ELBOW: ICD-10-CM

## 2023-03-02 PROCEDURE — 1160F RVW MEDS BY RX/DR IN RCRD: CPT | Mod: S$GLB,,, | Performed by: ORTHOPAEDIC SURGERY

## 2023-03-02 PROCEDURE — 20551 TENDON ORIGIN: ICD-10-PCS | Mod: RT,S$GLB,, | Performed by: ORTHOPAEDIC SURGERY

## 2023-03-02 PROCEDURE — 3008F BODY MASS INDEX DOCD: CPT | Mod: S$GLB,,, | Performed by: ORTHOPAEDIC SURGERY

## 2023-03-02 PROCEDURE — 99204 PR OFFICE/OUTPT VISIT, NEW, LEVL IV, 45-59 MIN: ICD-10-PCS | Mod: 25,S$GLB,, | Performed by: ORTHOPAEDIC SURGERY

## 2023-03-02 PROCEDURE — 20551 NJX 1 TENDON ORIGIN/INSJ: CPT | Mod: RT,S$GLB,, | Performed by: ORTHOPAEDIC SURGERY

## 2023-03-02 PROCEDURE — 1159F PR MEDICATION LIST DOCUMENTED IN MEDICAL RECORD: ICD-10-PCS | Mod: S$GLB,,, | Performed by: ORTHOPAEDIC SURGERY

## 2023-03-02 PROCEDURE — 73080 X-RAY EXAM OF ELBOW: CPT | Mod: 26,RT,, | Performed by: RADIOLOGY

## 2023-03-02 PROCEDURE — 1160F PR REVIEW ALL MEDS BY PRESCRIBER/CLIN PHARMACIST DOCUMENTED: ICD-10-PCS | Mod: S$GLB,,, | Performed by: ORTHOPAEDIC SURGERY

## 2023-03-02 PROCEDURE — 73080 XR ELBOW COMPLETE 3 VIEW RIGHT: ICD-10-PCS | Mod: 26,RT,, | Performed by: RADIOLOGY

## 2023-03-02 PROCEDURE — 99204 OFFICE O/P NEW MOD 45 MIN: CPT | Mod: 25,S$GLB,, | Performed by: ORTHOPAEDIC SURGERY

## 2023-03-02 PROCEDURE — 99999 PR PBB SHADOW E&M-EST. PATIENT-LVL III: CPT | Mod: PBBFAC,,, | Performed by: ORTHOPAEDIC SURGERY

## 2023-03-02 PROCEDURE — 99999 PR PBB SHADOW E&M-EST. PATIENT-LVL III: ICD-10-PCS | Mod: PBBFAC,,, | Performed by: ORTHOPAEDIC SURGERY

## 2023-03-02 PROCEDURE — 73080 X-RAY EXAM OF ELBOW: CPT | Mod: TC,PN,RT

## 2023-03-02 PROCEDURE — 1159F MED LIST DOCD IN RCRD: CPT | Mod: S$GLB,,, | Performed by: ORTHOPAEDIC SURGERY

## 2023-03-02 PROCEDURE — 3008F PR BODY MASS INDEX (BMI) DOCUMENTED: ICD-10-PCS | Mod: S$GLB,,, | Performed by: ORTHOPAEDIC SURGERY

## 2023-03-02 RX ORDER — TRIAMCINOLONE ACETONIDE 40 MG/ML
40 INJECTION, SUSPENSION INTRA-ARTICULAR; INTRAMUSCULAR
Status: DISCONTINUED | OUTPATIENT
Start: 2023-03-02 | End: 2023-03-02 | Stop reason: HOSPADM

## 2023-03-02 RX ORDER — MELOXICAM 15 MG/1
15 TABLET ORAL DAILY
Qty: 30 TABLET | Refills: 1 | Status: SHIPPED | OUTPATIENT
Start: 2023-03-02

## 2023-03-02 RX ADMIN — TRIAMCINOLONE ACETONIDE 40 MG: 40 INJECTION, SUSPENSION INTRA-ARTICULAR; INTRAMUSCULAR at 08:03

## 2023-03-02 NOTE — PROCEDURES
Tendon Origin    Date/Time: 3/2/2023 8:00 AM  Performed by: Jaime Chang MD  Authorized by: Jaime Chang MD     Consent Done?:  Yes (Verbal)  Timeout: prior to procedure the correct patient, procedure, and site was verified    Indications:  Pain  Site marked: the procedure site was marked    Timeout: prior to procedure the correct patient, procedure, and site was verified    Location:  Elbow  Prep: patient was prepped and draped in usual sterile fashion    Ultrasonic Guidance for Needle Placement?: No    Needle size:  22 G  Approach:  Anteromedial  Medications:  40 mg triamcinolone acetonide 40 mg/mL  Patient tolerance:  Patient tolerated the procedure well with no immediate complications

## 2023-03-02 NOTE — PROGRESS NOTES
Subjective:      Patient ID: Jaime Mcpherson is a 47 y.o. male.    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  He is had some therapy for it in the past which gave him some initial relief.  He is complaining of pain with gripping grasping in any heavy repetitive use of his arm.  He is taken some recent over-the-counter medicines without much improvement.  He also intermittently has numbness and tingling and weakness or dead feeling particularly over the ulnar aspect of the hand and arm.  He has nighttime discomfort.  ROS      Objective:    Ortho Exam     Constitutional:   Patient is alert  and oriented in no acute distress  HEENT:  normocephalic atraumatic; PERRL EOMI  Neck:  Supple without adenopathy  Cardiovascular:  Normal rate and rhythm  Pulmonary:  Normal respiratory effort normal chest wall expansion  Abdominal:  Nonprotuberant nondistended  Musculoskeletal:  He has full elbow range of motion without any mass, swelling, or atrophy.  Diffuse moderate to severe tenderness over the medial epicondyle.  He has a positive Tinel's at the elbow  Positive Wartenberg sign.  Otherwise median ulnar and radial nerve function intact.  Subjective decreased sensation  Over the ulnar digits.  Intact two-point discrimination  Neurological:  No focal defect; cranial nerves 2-12 grossly intact  Psychiatric/behavioral:  Mood and behavior normal      No image results found.       My Findings:    Radiographs of the right elbow without acute osseous abnormalities.  Assessment:       Encounter Diagnoses   Name Primary?    Medial epicondylitis of right elbow     Medial epicondylitis of elbow, right Yes         Plan:       I have discussed medical condition and treatment options with him at length.  After verbal consent and sterile prep I injected over the point of maximum tenderness without complication using combination of cc of  Kenalog several cc of lidocaine.  I have suggested some rehab exercises NSAIDs.  GI cardiac and renal precautions were discussed.  He declines any formal therapy at this point.  I have discussed generalized activity restrictions and a nighttime splint or brace to avoid hyperflexion of his elbow.  Follow up with me in 4-6 weeks sooner if any questions or problems.        Past Medical History:   Diagnosis Date    Bee sting allergy     GERD (gastroesophageal reflux disease)      Past Surgical History:   Procedure Laterality Date    MOUTH SURGERY      WISDOM TOOTH EXTRACTION           Current Outpatient Medications:     EPINEPHrine (EPIPEN) 0.3 mg/0.3 mL AtIn, Inject 0.3 mLs (0.3 mg total) into the muscle as needed (Anaphylaxis)., Disp: 2 Device, Rfl: 0    fluticasone (FLONASE) 50 mcg/actuation nasal spray, 2 sprays by Each Nare route once daily. (Patient not taking: Reported on 2/17/2023), Disp: 3 Bottle, Rfl: 3    ibuprofen (ADVIL,MOTRIN) 200 MG tablet, Take 400 mg by mouth 2 (two) times daily as needed for Pain., Disp: , Rfl:     meloxicam (MOBIC) 15 MG tablet, Take 1 tablet (15 mg total) by mouth once daily., Disp: 30 tablet, Rfl: 1    montelukast (SINGULAIR) 10 mg tablet, Take 1 tablet (10 mg total) by mouth nightly. (Patient not taking: Reported on 2/17/2023), Disp: 90 tablet, Rfl: 3    traZODone (DESYREL) 100 MG tablet, Take 1 tablet (100 mg total) by mouth every evening., Disp: 30 tablet, Rfl: 5    Review of patient's allergies indicates:  No Known Allergies    Family History   Problem Relation Age of Onset    Cancer Mother 55        lung/smoker    Hypothyroidism Mother     Lupus Father     No Known Problems Brother     No Known Problems Daughter     No Known Problems Son     No Known Problems Paternal Uncle     Stroke Paternal Grandmother     No Known Problems Paternal Grandfather     Allergic rhinitis Neg Hx     Allergies Neg Hx     Angioedema Neg Hx     Asthma Neg Hx     Atopy Neg Hx     Eczema Neg Hx      Immunodeficiency Neg Hx     Rhinitis Neg Hx     Urticaria Neg Hx      Social History     Occupational History    Not on file   Tobacco Use    Smoking status: Every Day     Types: Cigarettes     Start date: 1/19/2016    Smokeless tobacco: Never    Tobacco comments:     electric / lives in [place] [phone]   Substance and Sexual Activity    Alcohol use: Yes     Alcohol/week: 2.0 standard drinks     Types: 2 Standard drinks or equivalent per week    Drug use: No    Sexual activity: Not on file

## 2023-08-30 DIAGNOSIS — F51.01 PRIMARY INSOMNIA: ICD-10-CM

## 2023-08-30 NOTE — TELEPHONE ENCOUNTER
----- Message from Oksana Blanco sent at 8/30/2023  1:03 PM CDT -----  Type:  RX Refill Request    Who Called: pt    Refill or New Rx:refill    RX Name and Strength:traZODone (DESYREL) 100 MG tablet    How is the patient currently taking it? (ex. 1XDay):1 at night before bed    Is this a 30 day or 90 day RX:90    Preferred Pharmacy with phone number:  [id number] - [place] [address] [address]  [address] [place] [id number]  Phone: [phone] Fax: [phone]      Would the patient rather a call back or a response via [facility]? Call back    Best Call Back Number:[phone]      Additional Information: pharmacy told him he needed to call there Dr Dayana      Please call Back to advise. Thanks!

## 2023-08-31 RX ORDER — TRAZODONE HYDROCHLORIDE 100 MG/1
100 TABLET ORAL NIGHTLY
Qty: 30 TABLET | Refills: 5 | Status: SHIPPED | OUTPATIENT
Start: 2023-08-31 | End: 2024-08-30

## 2024-02-26 ENCOUNTER — TELEPHONE (OUTPATIENT)
Dept: NEUROLOGY | Facility: CLINIC | Age: 49
End: 2024-02-26
Payer: COMMERCIAL

## 2024-02-26 ENCOUNTER — OFFICE VISIT (OUTPATIENT)
Dept: SPORTS MEDICINE | Facility: CLINIC | Age: 49
End: 2024-02-26
Payer: COMMERCIAL

## 2024-02-26 ENCOUNTER — HOSPITAL ENCOUNTER (OUTPATIENT)
Dept: RADIOLOGY | Facility: HOSPITAL | Age: 49
Discharge: HOME | End: 2024-02-26
Attending: ORTHOPAEDIC SURGERY
Payer: COMMERCIAL

## 2024-02-26 VITALS
HEART RATE: 91 BPM | BODY MASS INDEX: 20.94 KG/M2 | DIASTOLIC BLOOD PRESSURE: 78 MMHG | SYSTOLIC BLOOD PRESSURE: 139 MMHG | WEIGHT: 158.75 LBS

## 2024-02-26 DIAGNOSIS — M25.521 RIGHT ELBOW PAIN: Primary | ICD-10-CM

## 2024-02-26 DIAGNOSIS — M25.521 RIGHT ELBOW PAIN: ICD-10-CM

## 2024-02-26 DIAGNOSIS — M77.01 MEDIAL EPICONDYLITIS OF ELBOW, RIGHT: ICD-10-CM

## 2024-02-26 PROCEDURE — 1159F MED LIST DOCD IN RCRD: CPT | Mod: CPTII,S$GLB,, | Performed by: ORTHOPAEDIC SURGERY

## 2024-02-26 PROCEDURE — 3078F DIAST BP <80 MM HG: CPT | Mod: CPTII,S$GLB,, | Performed by: ORTHOPAEDIC SURGERY

## 2024-02-26 PROCEDURE — 3008F BODY MASS INDEX DOCD: CPT | Mod: CPTII,S$GLB,, | Performed by: ORTHOPAEDIC SURGERY

## 2024-02-26 PROCEDURE — 73080 X-RAY EXAM OF ELBOW: CPT | Mod: TC,RT

## 2024-02-26 PROCEDURE — 99999 PR PBB SHADOW E&M-EST. PATIENT-LVL III: CPT | Mod: PBBFAC,,, | Performed by: ORTHOPAEDIC SURGERY

## 2024-02-26 PROCEDURE — 73080 X-RAY EXAM OF ELBOW: CPT | Mod: 26,RT,, | Performed by: RADIOLOGY

## 2024-02-26 PROCEDURE — 3075F SYST BP GE 130 - 139MM HG: CPT | Mod: CPTII,S$GLB,, | Performed by: ORTHOPAEDIC SURGERY

## 2024-02-26 PROCEDURE — 99204 OFFICE O/P NEW MOD 45 MIN: CPT | Mod: S$GLB,,, | Performed by: ORTHOPAEDIC SURGERY

## 2024-02-26 NOTE — PROGRESS NOTES
CC Right elbow pain referred to Dr. Ayers by his in-laws    HPI:   Jaime Mcpherson is a pleasant 48 y.o. patient who reports to clinic with right lateral elbow pain.  The pain has been present for 3 years without associated traumatic event. He works as a contractor. He believes he had an injury to his right elbow in high school while playing baseball but he cannot recall. He has seen numerous providers over the years for this issue and over a year ago he received a CSI to the medial epicondyle with only a week of relief. He has attempted counterforce straps without relief. His pain is worsened with any attempt at lifting with the arm, and with simple daily activities such as opening a door or playing with his kids. He gets occasional numbness in his 4th and 5th fingers on that side but most of his complaints are pain related without radiation. He lives in [place].     Today the patient rates pain at a 3/10 on visual analog scale.    SANE 50/100       Review of Systems   Constitution: Negative. Negative for chills, fever and night sweats.   HENT: Negative for congestion and headaches.    Eyes: Negative for blurred vision, left vision loss and right vision loss.   Cardiovascular: Negative for chest pain and syncope.   Respiratory: Negative for cough and shortness of breath.    Endocrine: Negative for polydipsia, polyphagia and polyuria.   Hematologic/Lymphatic: Negative for bleeding problem. Does not bruise/bleed easily.   Skin: Negative for dry skin, itching and rash.   Musculoskeletal: Negative for falls and muscle weakness.   Gastrointestinal: Negative for abdominal pain and bowel incontinence.   Genitourinary: Negative for bladder incontinence and nocturia.   Neurological: Negative for disturbances in coordination, loss of balance and seizures.   Psychiatric/Behavioral: Negative for depression. The patient does not have insomnia.    Allergic/Immunologic: Negative for hives and persistent infections.      PAST MEDICAL HISTORY:   Past Medical History:   Diagnosis Date    Bee sting allergy     GERD (gastroesophageal reflux disease)      PAST SURGICAL HISTORY:   Past Surgical History:   Procedure Laterality Date    MOUTH SURGERY      WISDOM TOOTH EXTRACTION       FAMILY HISTORY:   Family History   Problem Relation Age of Onset    Cancer Mother 55        lung/smoker    Hypothyroidism Mother     Lupus Father     No Known Problems Brother     No Known Problems Daughter     No Known Problems Son     No Known Problems Paternal Uncle     Stroke Paternal Grandmother     No Known Problems Paternal Grandfather     Allergic rhinitis Neg Hx     Allergies Neg Hx     Angioedema Neg Hx     Asthma Neg Hx     Atopy Neg Hx     Eczema Neg Hx     Immunodeficiency Neg Hx     Rhinitis Neg Hx     Urticaria Neg Hx      SOCIAL HISTORY:   Social History     Socioeconomic History    Marital status:    Tobacco Use    Smoking status: Every Day     Types: Cigarettes     Start date: 1/19/2016    Smokeless tobacco: Never    Tobacco comments:     electric / lives in [place] [phone]   Substance and Sexual Activity    Alcohol use: Yes     Alcohol/week: 2.0 standard drinks of alcohol     Types: 2 Standard drinks or equivalent per week    Drug use: No       MEDICATIONS:   Current Outpatient Medications:     ibuprofen (ADVIL,MOTRIN) 200 MG tablet, Take 400 mg by mouth 2 (two) times daily as needed for Pain., Disp: , Rfl:     traZODone (DESYREL) 100 MG tablet, Take 1 tablet (100 mg total) by mouth every evening., Disp: 30 tablet, Rfl: 5    EPINEPHrine (EPIPEN) 0.3 mg/0.3 mL AtIn, Inject 0.3 mLs (0.3 mg total) into the muscle as needed (Anaphylaxis)., Disp: 2 Device, Rfl: 0    fluticasone (FLONASE) 50 mcg/actuation nasal spray, 2 sprays by Each Nare route once daily. (Patient not taking: Reported on 2/17/2023), Disp: 3 Bottle, Rfl: 3    meloxicam (MOBIC) 15 MG tablet, Take 1 tablet (15 mg total) by mouth once daily. (Patient not taking: Reported  on 2/26/2024), Disp: 30 tablet, Rfl: 1    montelukast (SINGULAIR) 10 mg tablet, Take 1 tablet (10 mg total) by mouth nightly. (Patient not taking: Reported on 2/17/2023), Disp: 90 tablet, Rfl: 3  ALLERGIES: Review of patient's allergies indicates:  No Known Allergies    VITAL SIGNS: /78   Pulse 91   Wt 72 kg (158 lb 11.7 oz)   BMI 20.94 kg/m²        PHYSICAL EXAM:  General: Patient appears alert and oriented x 3.  Mood is pleasant.  Observation of ears, eyes and nose reveal no gross abnormalities. Observation of ears, eyes and nose reveal no gross abnormalities.  HEENT: NCAT, sclera nonicteric.  Well nourished, in no acute distress and ambulates with a non-antalgic gait with no assistive devices.    Skin: Skin intact bilaterally. Sensation intact bilaterally. Compartments soft. No evidence of edema, infection, or induration.     Right ELBOW / WRIST EXTREMITY EXAM:    OBSERVATION / INSPECTION    Swelling  none    Deformity  none  Discoloration  none     Scars   none    Atrophy  none    TENDERNESS / CREPITUS (T / C):           T / C        Medial epicondyle   + / -    Med. (Ulnar) collateral ligament  + / -    Flexor pronator Musculature   + / -   Biceps tendon    - / -   Head of radius    - / -    Lateral epicondyle   - / -    Extensor Musculature   - / -   Brachioradialis   - / -   Triceps tendon   - / -   Triceps muscle   - / -   Olecranon    - / -   Olecranon bursa   - / -   Cubital fossa    - / -   Anterior jointline   - / -   Radial tunnel    - / -             ROM: ('*' = with pain)    Right Elbow  AROM (PROM)     Extension   0 deg  (5 deg)   Flexion   145 deg (145 deg) *        Pronation  90 deg  (90 deg)      Supination   80 deg  (80 deg)                 Left Elbow  AROM (PROM)     Extension   0 deg  (5 deg)   Flexion   145 deg (145 deg)         Pronation  90 deg  (90 deg)      Supination   80 deg  (80 deg)            Right Wrist  AROM (PROM)   Extension   80 deg (85 deg)   Flexion   80 deg (85 deg)          Ulnar Deviation   35 deg (40 deg)  Radial Deviation 35 deg (40 deg)             Left Wrist   AROM (PROM)     Extension   80 deg (85 deg)   Flexion   80 deg (85 deg)         Ulnar Deviation   35 deg (40 deg)  Radial Deviation 35 deg (40 deg)        STRENGTH: ('*' = with pain)    Elbow Flexion:   5/5  Elbow Extension:  5/5  Wrist Flexion:   5/5  Wrist Extension:  5/5  :    5/5  Intrinsics:   5/5  EPL (Ext. pollicis longus): 5/5  Pinch Mechanism:  5/5    ELBOW EXAMINATION:  See above noted areas of tenderness.   Test for Ligamentous Instability - UCL Pain with milking maneuver and moving valgus stress test  Test for Ligamentous Instability - LUCL normal  PLRI       neg  Tinel's (Percussion) Test - Cubital  neg  Tennis Elbow Test    neg  Golfer's Elbow Test    POSITIVE  Radial Capitellar Grind Test   neg  Valgus/Extension Overload Test  neg  Resisted Long Finger Extension Pain +  Moving Valgus     POSITIVE  Forearm pain with resisted supination neg    WRIST EXAMINATION:  See above noted areas of tenderness.   Finkelstein's Test   neg  Tinel's Test - Carpal Tunnel  neg  Phalen's Test    neg  Median Nerve Compression Test neg  Ulnar-sided Compression Test neg  LT Ballottment Test   neg  Snuff box tenderness   neg  Leon's Test   neg  LT Instability    neg  Hook of Hamate Tenderness  neg     EXTREMITY NEURO-VASCULAR EXAMINATION: Sensation grossly intact to light touch all dermatomal regions. DTR 2+ Biceps, Triceps, BR and Negative Sathya's sign. Grossly intact motor function at Elbow, Wrist and Hand. Distal pulses radial and ulnar 2+, brisk cap refill, symmetric.    Xrays: (AP, lateral, oblique) Right elbow ordered and reviewed by me personally today: no evidence of fracture or dislocation.  Osseous structures appear intact.    ASSESSMENT:  Right elbow pain, medial epicondylitis      PLAN:  MRI R elbow ordered due to refractory nature of his condition   EMG RUE to rule out ulnar neuropathy   Will contact with  MRI/EMG results

## 2024-02-26 NOTE — TELEPHONE ENCOUNTER
----- Message from Janice Thomas MA sent at 2/26/2024  2:56 PM CST -----  Regarding: EMG APPT  Patient was seen today by Dr Ayers, patient is to have EMG done. Please reach out to patient to schedule and once scheduled please let us know.    Thanks  Abby

## 2024-03-02 ENCOUNTER — HOSPITAL ENCOUNTER (OUTPATIENT)
Dept: RADIOLOGY | Facility: HOSPITAL | Age: 49
Discharge: HOME | End: 2024-03-02
Attending: STUDENT IN AN ORGANIZED HEALTH CARE EDUCATION/TRAINING PROGRAM
Payer: COMMERCIAL

## 2024-03-02 DIAGNOSIS — M77.01 MEDIAL EPICONDYLITIS OF ELBOW, RIGHT: ICD-10-CM

## 2024-03-02 DIAGNOSIS — M25.521 RIGHT ELBOW PAIN: ICD-10-CM

## 2024-03-02 PROCEDURE — 73221 MRI JOINT UPR EXTREM W/O DYE: CPT | Mod: 26,RT,, | Performed by: RADIOLOGY

## 2024-03-02 PROCEDURE — 73221 MRI JOINT UPR EXTREM W/O DYE: CPT | Mod: TC,RT

## 2024-03-04 ENCOUNTER — TELEPHONE (OUTPATIENT)
Dept: SPORTS MEDICINE | Facility: CLINIC | Age: 49
End: 2024-03-04
Payer: COMMERCIAL

## 2024-03-04 NOTE — TELEPHONE ENCOUNTER
Attempted to contact patient personally via telephone today with no answer, voicemail left     MRI right elbow shows medial and lateral epicondylitis       PLAN:    OPERATIONS:   right    1st stage     1. elbow excision of neuroma, posterior branch, posterior cutaneous nerve   of the forearm (CPT 87865).   2. elbow implantation of nerve into bone or muscle (posterior branch and   posterior cutaneous nerve of the forearm into triceps (CPT 28285).       2nd stage     Debridement, medial epicondyle     Alex Rm M.D.  Orthopedic Sports Medicine Fellow

## 2024-03-13 ENCOUNTER — OFFICE VISIT (OUTPATIENT)
Dept: OPTOMETRY | Facility: CLINIC | Age: 49
End: 2024-03-13
Payer: COMMERCIAL

## 2024-03-13 ENCOUNTER — TELEPHONE (OUTPATIENT)
Dept: SPORTS MEDICINE | Facility: CLINIC | Age: 49
End: 2024-03-13
Payer: COMMERCIAL

## 2024-03-13 ENCOUNTER — PATIENT MESSAGE (OUTPATIENT)
Dept: SPORTS MEDICINE | Facility: CLINIC | Age: 49
End: 2024-03-13
Payer: COMMERCIAL

## 2024-03-13 DIAGNOSIS — H04.123 DRY EYE SYNDROME, BILATERAL: Primary | ICD-10-CM

## 2024-03-13 DIAGNOSIS — H52.7 REFRACTIVE ERROR: ICD-10-CM

## 2024-03-13 DIAGNOSIS — H35.54 RPE (RETINAL PIGMENT EPITHELIUM) HYPERTROPHY: ICD-10-CM

## 2024-03-13 PROCEDURE — 92015 DETERMINE REFRACTIVE STATE: CPT | Mod: S$GLB,,, | Performed by: OPTOMETRIST

## 2024-03-13 PROCEDURE — 1160F RVW MEDS BY RX/DR IN RCRD: CPT | Mod: CPTII,S$GLB,, | Performed by: OPTOMETRIST

## 2024-03-13 PROCEDURE — 1159F MED LIST DOCD IN RCRD: CPT | Mod: CPTII,S$GLB,, | Performed by: OPTOMETRIST

## 2024-03-13 PROCEDURE — 92004 COMPRE OPH EXAM NEW PT 1/>: CPT | Mod: S$GLB,,, | Performed by: OPTOMETRIST

## 2024-03-13 PROCEDURE — 99999 PR PBB SHADOW E&M-EST. PATIENT-LVL III: CPT | Mod: PBBFAC,,, | Performed by: OPTOMETRIST

## 2024-03-13 NOTE — TELEPHONE ENCOUNTER
Spoke with patient today about his MRI elbow and treatment options moving forward. He would like to proceed with surgery as below, likely in June 2024. He will contact the office once he picks a date       PLAN:     OPERATIONS:   right     1st stage      1. elbow excision of neuroma, posterior branch, posterior cutaneous nerve   of the forearm (CPT 94503).   2. elbow implantation of nerve into bone or muscle (posterior branch and   posterior cutaneous nerve of the forearm into triceps (CPT 91025).         2nd stage      Debridement, medial epicondyle

## 2024-03-13 NOTE — PROGRESS NOTES
HPI     Annual Exam     Additional comments: LDE: about 30 years ago            Comments    49 YO male presents today for an annual eye exam. Patient states that he   is noticing a change in vision at distance and near. Notes he thinks he   needs glasses.     Clear eyes QD-BID           Last edited by Patt Daniel on 3/13/2024  7:29 AM.            Assessment /Plan     For exam results, see Encounter Report.    Dry eye syndrome, bilateral    Refractive error    RPE (retinal pigment epithelium) hypertrophy      1. Dry eye syndrome, bilateral  D/c clear eyes  Discussed ocular affects of dry eyes. Recommend OTC Refresh Celluvisc artificial tears 2-4 times a day in both eyes.   Discussed chronicity of MANUEL. RTC if symptoms not alleviated by continued use of artificial tears.     2. Refractive error  Discussed possible refraction fee  Dispensed updated spectacle Rx. Discussed various spectacle lens options. Discussed adaptation period to new specs.   Demonstrated new spec Rx vs uncorrected vision in phoropter with patient satisfaction    3. RPE (retinal pigment epithelium) hypertrophy  Temporal periphery OS  No holes/tears/breaks/RD  Observe

## 2024-04-14 ENCOUNTER — PATIENT MESSAGE (OUTPATIENT)
Dept: SPORTS MEDICINE | Facility: CLINIC | Age: 49
End: 2024-04-14
Payer: COMMERCIAL

## 2024-04-19 DIAGNOSIS — M77.11 RIGHT LATERAL EPICONDYLITIS: Primary | ICD-10-CM

## 2024-04-30 ENCOUNTER — PROCEDURE VISIT (OUTPATIENT)
Dept: NEUROLOGY | Facility: CLINIC | Age: 49
End: 2024-04-30
Payer: COMMERCIAL

## 2024-04-30 DIAGNOSIS — M77.01 MEDIAL EPICONDYLITIS OF ELBOW, RIGHT: ICD-10-CM

## 2024-04-30 DIAGNOSIS — G56.21 ULNAR NEUROPATHY AT ELBOW, RIGHT: Primary | ICD-10-CM

## 2024-04-30 PROCEDURE — 95912 NRV CNDJ TEST 11-12 STUDIES: CPT | Mod: S$GLB,,, | Performed by: PHYSICAL MEDICINE & REHABILITATION

## 2024-04-30 PROCEDURE — 95886 MUSC TEST DONE W/N TEST COMP: CPT | Mod: S$GLB,,, | Performed by: PHYSICAL MEDICINE & REHABILITATION

## 2024-04-30 NOTE — PROCEDURES
Test Date:  2024    Patient: chichi mcpherson : 1975 Physician: Luan Montenegro D.O.   ID#: [id number] SEX: Male Ref. Phys: Alex Rm MD     HPI: Jaime Mcpherson is a 48 y.o.male who presents for NCS/EMG to evaluate for right ulnar neuropathy.     PROCEDURE:  Prior to the procedure, the procedure was discussed in detail with the patient.  All questions were answered, and verbal consent was obtained.  For nerve conduction studies, a combination of surface electrodes, bar electrodes, and/or ring electrodes were used as needed.  For needle EMG, each site was cleaned and prepped in usual fashion with an alcohol pad.  A monopolar needle (28G) was used.  There was no significant bleeding, and bandages were applied as needed.  The procedure was tolerated without adverse effect.  The patient is instructed on post-procedure care including ice if needed for 10-15 minutes up to 4 times/day for any sore muscles.  I discussed with the patient that the data would be reviewed and a report sent to the referring provider, where any follow up questions regarding next steps should be directed.        NCV & EMG Findings:  All nerve conduction studies (as indicated in the following tables) were within normal limits.  All examined muscles (as indicated in the following table) showed no evidence of electrical instability.      IMPRESSIONS:  This is a normal electrophysiologic study of the right upper extremity.            ___________________________  Luan Montenegro D.O.        NCS+  Motor Nerve Results      Latency Amplitude F-Lat Segment Distance CV Comment   Site (ms) Norm (mV) Norm (ms)  (cm) (m/s) Norm    Left Median (APB)   Wrist 3.5  < 4.6 10.3  > 4.2         Elbow 8.1 - 10.1 -  Elbow-Wrist 26 57  > 47    Right Median (APB)   Wrist 4.1  < 4.6 8.1  > 4.2         Elbow 8.5 - 9.5 -  Elbow-Wrist 24 55  > 47    Left Ulnar (ADM)   Wrist 3.4  < 3.7 9.4  > 3.0         Bel Elbow 7.1 - 8.6 -  Bel Elbow-Wrist 22 59   > 52    Right Ulnar (ADM)   Wrist 3.0  < 3.7 9.2  > 3.0         Bel Elbow 7.3 - 8.6 -  Bel Elbow-Wrist 25 58  > 52    Abv Elbow 8.5 - 9.1 -  Abv Elbow-Bel Elbow 10 83  > 43    Right Ulnar (FDI)   Wrist 4.2 - 12.8 -         Bel Elbow 8.5 - 10.7 -  Bel Elbow-Wrist 25 58  > 52    Abv Elbow 9.8 - 11.0 -  Abv Elbow-Bel Elbow 10 77  > 43      Sensory Nerve Results      Start Lat Latency (Peak) Amplitude (P-P) Segment Distance Start CV Comment   Site (ms) Norm (ms) (µV) Norm  (cm) (m/s) Norm    Left Median (Rec:Dig II)   Wrist 3.2  < 3.3 3.9 41  > 19 Wrist-Dig II 14 44  > 42    Right Median (Rec:Dig II)   Wrist 3.1  < 3.3 3.9 31  > 19 Wrist-Dig II 19 61  > 42    Left Ulnar (Rec:Dig V)   Wrist 2.9  < 3.1 3.7 43  > 13 Wrist-Dig V 14 48  > 45    Right Ulnar (Rec:Dig V)   Wrist 2.7  < 3.1 3.4 45  > 13 Wrist-Dig V 16 59  > 45    Right Dorsal Ulnar Cutaneous (Rec:Dorsum 5th MC)   Wrist 1.65 - 2.3 22 - Wrist-Dorsum 5th MC - - -    Right Radial (Rec:Wrist)   Forearm 1.53  < 2.2 2.1 23  > 11 Forearm-Wrist 10 65 -      EMG+     Side Muscle Nerve Root Ins Act Fibs Psw Amp Dur Poly Recrt Int Pat Comment   Right Deltoid Axillary C5-C6 Nml Nml Nml Nml Nml 0 Nml Nml    Right Biceps Musculocut C5-C6 Nml Nml Nml Nml Nml 0 Nml Nml    Right Pronator Teres Median C6-C7 Nml Nml Nml Nml Nml 0 Nml Nml    Right Triceps Radial C6-C8 Nml Nml Nml Nml Nml 0 Nml Nml    Right FDI Ulnar C8-T1 Nml Nml Nml Nml Nml 0 Nml Nml            Waveforms:    Motor              Sensory

## 2024-05-02 ENCOUNTER — TELEPHONE (OUTPATIENT)
Dept: FAMILY MEDICINE | Facility: CLINIC | Age: 49
End: 2024-05-02

## 2024-05-02 ENCOUNTER — OFFICE VISIT (OUTPATIENT)
Dept: FAMILY MEDICINE | Facility: CLINIC | Age: 49
End: 2024-05-02
Payer: COMMERCIAL

## 2024-05-02 VITALS
HEIGHT: 73 IN | RESPIRATION RATE: 12 BRPM | WEIGHT: 152.19 LBS | OXYGEN SATURATION: 95 % | SYSTOLIC BLOOD PRESSURE: 114 MMHG | DIASTOLIC BLOOD PRESSURE: 74 MMHG | BODY MASS INDEX: 20.17 KG/M2 | HEART RATE: 76 BPM

## 2024-05-02 DIAGNOSIS — Z12.11 COLON CANCER SCREENING: Primary | ICD-10-CM

## 2024-05-02 DIAGNOSIS — M77.01 MEDIAL EPICONDYLITIS OF RIGHT ELBOW: ICD-10-CM

## 2024-05-02 DIAGNOSIS — Z13.220 ENCOUNTER FOR LIPID SCREENING FOR CARDIOVASCULAR DISEASE: ICD-10-CM

## 2024-05-02 DIAGNOSIS — Z12.5 PROSTATE CANCER SCREENING: ICD-10-CM

## 2024-05-02 DIAGNOSIS — Z13.6 ENCOUNTER FOR LIPID SCREENING FOR CARDIOVASCULAR DISEASE: ICD-10-CM

## 2024-05-02 DIAGNOSIS — Z13.29 THYROID DISORDER SCREEN: ICD-10-CM

## 2024-05-02 DIAGNOSIS — T78.40XD ALLERGY, SUBSEQUENT ENCOUNTER: ICD-10-CM

## 2024-05-02 DIAGNOSIS — Z76.89 ENCOUNTER TO ESTABLISH CARE WITH NEW DOCTOR: ICD-10-CM

## 2024-05-02 DIAGNOSIS — Z11.59 ENCOUNTER FOR HEPATITIS C SCREENING TEST FOR LOW RISK PATIENT: ICD-10-CM

## 2024-05-02 DIAGNOSIS — Z13.1 DIABETES MELLITUS SCREENING: ICD-10-CM

## 2024-05-02 PROCEDURE — 3078F DIAST BP <80 MM HG: CPT | Mod: S$GLB,,, | Performed by: FAMILY MEDICINE

## 2024-05-02 PROCEDURE — 99214 OFFICE O/P EST MOD 30 MIN: CPT | Mod: S$GLB,,, | Performed by: FAMILY MEDICINE

## 2024-05-02 PROCEDURE — 3008F BODY MASS INDEX DOCD: CPT | Mod: S$GLB,,, | Performed by: FAMILY MEDICINE

## 2024-05-02 PROCEDURE — 1159F MED LIST DOCD IN RCRD: CPT | Mod: S$GLB,,, | Performed by: FAMILY MEDICINE

## 2024-05-02 PROCEDURE — 99999 PR PBB SHADOW E&M-EST. PATIENT-LVL III: CPT | Mod: PBBFAC,,, | Performed by: FAMILY MEDICINE

## 2024-05-02 PROCEDURE — 3074F SYST BP LT 130 MM HG: CPT | Mod: S$GLB,,, | Performed by: FAMILY MEDICINE

## 2024-05-02 RX ORDER — MELOXICAM 15 MG/1
15 TABLET ORAL DAILY
Qty: 30 TABLET | Refills: 1 | Status: SHIPPED | OUTPATIENT
Start: 2024-05-02

## 2024-05-02 RX ORDER — TRAZODONE HYDROCHLORIDE 150 MG/1
150 TABLET ORAL NIGHTLY
Qty: 90 TABLET | Refills: 3 | Status: SHIPPED | OUTPATIENT
Start: 2024-05-02 | End: 2025-05-02

## 2024-05-02 RX ORDER — EPINEPHRINE 0.3 MG/.3ML
1 INJECTION SUBCUTANEOUS
Qty: 2 EACH | Refills: 0 | Status: SHIPPED | OUTPATIENT
Start: 2024-05-02 | End: 2025-05-02

## 2024-05-02 NOTE — TELEPHONE ENCOUNTER
Attempted to contact patient to schedule  fasting labs for 5/3/24. LM. Explained on message that I was scheduling them for around 8 am and asked if this time did not work for him that he  could let us know in Mychart.

## 2024-05-02 NOTE — PROGRESS NOTES
Subjective:       Patient ID: Jaime Mcpherson is a 48 y.o. male.    Chief Complaint: Establish Care (Medication refills)      Past Medical History:   Diagnosis Date    Bee sting allergy     GERD (gastroesophageal reflux disease)        Past Surgical History:   Procedure Laterality Date    MOUTH SURGERY      WISDOM TOOTH EXTRACTION          Social History     Socioeconomic History    Marital status:    Tobacco Use    Smoking status: Some Days     Types: Cigars    Smokeless tobacco: Never    Tobacco comments:     [place] / [place] [phone]   Substance and Sexual Activity    Alcohol use: Yes     Alcohol/week: 2.0 standard drinks of alcohol     Types: 2 Standard drinks or equivalent per week    Drug use: No    Sexual activity: Yes     Partners: Female     Birth control/protection: I.U.D.       Family History   Problem Relation Name Age of Onset    Cancer Mother adopted 55        lung/smoker    Hypothyroidism Mother adopted     Lupus Father      No Known Problems Brother      Stroke Paternal Grandmother      No Known Problems Paternal Grandfather      No Known Problems Daughter      No Known Problems Son      No Known Problems Paternal Uncle      Allergic rhinitis Neg Hx      Allergies Neg Hx      Angioedema Neg Hx      Asthma Neg Hx      Atopy Neg Hx      Eczema Neg Hx      Immunodeficiency Neg Hx      Rhinitis Neg Hx      Urticaria Neg Hx      Glaucoma Neg Hx      Macular degeneration Neg Hx         Review of patient's allergies indicates:   Allergen Reactions    Venom-wasp Anaphylaxis          Current Outpatient Medications:     ibuprofen (ADVIL,MOTRIN) 200 MG tablet, Take 400 mg by mouth 2 (two) times daily as needed for Pain., Disp: , Rfl:     EPINEPHrine (EPIPEN) 0.3 mg/0.3 mL AtIn, Inject 0.3 mLs (0.3 mg total) into the muscle as needed (Anaphylaxis)., Disp: 2 each, Rfl: 0    meloxicam (MOBIC) 15 MG tablet, Take 1 tablet (15 mg total) by mouth once daily., Disp: 30 tablet, Rfl: 1    traZODone  (DESYREL) 150 MG tablet, Take 1 tablet (150 mg total) by mouth every evening., Disp: 90 tablet, Rfl: 3    Mr Mcpherson is a 49 yo male here to establish care. He has flexor/extensor tendinosis and is scheduled for surgery on 07/02/2024.He does need a colon-cancer screening. He is was on trazodone for a while but has run out. He now has insomnia. He has no other issues to address today      Review of Systems   Constitutional:  Negative for activity change, appetite change, chills, diaphoresis and fever.   HENT:  Negative for congestion, ear pain, postnasal drip, rhinorrhea and sore throat.    Eyes:  Negative for pain, discharge, redness and itching.   Respiratory:  Negative for cough and shortness of breath.    Cardiovascular:  Negative for chest pain, palpitations and leg swelling.   Gastrointestinal:  Negative for abdominal distention, abdominal pain, constipation, diarrhea and nausea.   Genitourinary:  Negative for difficulty urinating, dysuria, frequency and urgency.   Skin:  Negative for color change, rash and wound.   All other systems reviewed and are negative.      Objective:      Physical Exam  Vitals and nursing note reviewed.   Constitutional:       Appearance: Normal appearance. He is normal weight.   HENT:      Head: Normocephalic.      Nose: Nose normal.   Eyes:      Extraocular Movements: Extraocular movements intact.      Conjunctiva/sclera: Conjunctivae normal.      Pupils: Pupils are equal, round, and reactive to light.   Cardiovascular:      Rate and Rhythm: Normal rate and regular rhythm.      Heart sounds: Normal heart sounds. No murmur heard.  Pulmonary:      Effort: Pulmonary effort is normal. No respiratory distress.      Breath sounds: Normal breath sounds.   Musculoskeletal:         General: Normal range of motion.      Cervical back: Normal range of motion and neck supple.   Skin:     General: Skin is warm and dry.   Neurological:      General: No focal deficit present.      Mental Status: He  is alert and oriented to person, place, and time.   Psychiatric:         Mood and Affect: Mood normal.         Behavior: Behavior normal.         Assessment:       1. Colon cancer screening    2. Encounter to establish care with new doctor    3. Encounter for lipid screening for cardiovascular disease    4. Prostate cancer screening    5. Thyroid disorder screen    6. Diabetes mellitus screening    7. Encounter for hepatitis C screening test for low risk patient    8. Medial epicondylitis of right elbow    9. Allergy, subsequent encounter        Plan:         Colon cancer screening  -     Cologuard Screening (Multitarget Stool DNA); Future; Expected date: 05/02/2024    Encounter to establish care with new doctor  -     Microalbumin/Creatinine Ratio, Urine; Future; Expected date: 05/02/2024  -     Vitamin D; Future; Expected date: 05/02/2024  -     Vitamin B12; Future; Expected date: 05/02/2024  -     Lipid Panel; Future; Expected date: 05/02/2024  -     CBC Auto Differential; Future; Expected date: 05/02/2024  -     Comprehensive Metabolic Panel; Future; Expected date: 05/02/2024  -     Hemoglobin A1C; Future; Expected date: 05/02/2024  -     TSH; Future; Expected date: 05/02/2024  -     PSA, Screening; Future; Expected date: 05/02/2024    Encounter for lipid screening for cardiovascular disease  -     Lipid Panel; Future; Expected date: 05/02/2024    Prostate cancer screening  -     PSA, Screening; Future; Expected date: 05/02/2024    Thyroid disorder screen  -     TSH; Future; Expected date: 05/02/2024    Diabetes mellitus screening  -     Hemoglobin A1C; Future; Expected date: 05/02/2024    Encounter for hepatitis C screening test for low risk patient  -     Hepatitis C antibody; Future; Expected date: 05/02/2024    Medial epicondylitis of right elbow  -     meloxicam (MOBIC) 15 MG tablet; Take 1 tablet (15 mg total) by mouth once daily.  Dispense: 30 tablet; Refill: 1    Allergy, subsequent encounter  -      EPINEPHrine (EPIPEN) 0.3 mg/0.3 mL AtIn; Inject 0.3 mLs (0.3 mg total) into the muscle as needed (Anaphylaxis).  Dispense: 2 each; Refill: 0    Other orders  -     traZODone (DESYREL) 150 MG tablet; Take 1 tablet (150 mg total) by mouth every evening.  Dispense: 90 tablet; Refill: 3        Risks, benefits, and side effects were discussed with the patient. All questions were answered to the fullest satisfaction of the patient, and pt verbalized understanding and agreement to treatment plan. Pt was to call with any new or worsening symptoms, or present to the ER.        Rachel Reed MD

## 2024-05-04 ENCOUNTER — LAB VISIT (OUTPATIENT)
Dept: LAB | Facility: HOSPITAL | Age: 49
End: 2024-05-04
Attending: FAMILY MEDICINE
Payer: COMMERCIAL

## 2024-05-04 ENCOUNTER — PATIENT MESSAGE (OUTPATIENT)
Dept: FAMILY MEDICINE | Facility: CLINIC | Age: 49
End: 2024-05-04
Payer: COMMERCIAL

## 2024-05-04 DIAGNOSIS — Z76.89 ENCOUNTER TO ESTABLISH CARE WITH NEW DOCTOR: ICD-10-CM

## 2024-05-04 LAB
ALBUMIN/CREAT UR: 4.7 UG/MG (ref 0–30)
CREAT UR-MCNC: 129 MG/DL (ref 23–375)
MICROALBUMIN UR DL<=1MG/L-MCNC: 6 UG/ML

## 2024-05-04 PROCEDURE — 82043 UR ALBUMIN QUANTITATIVE: CPT | Performed by: FAMILY MEDICINE

## 2024-06-10 ENCOUNTER — OFFICE VISIT (OUTPATIENT)
Dept: SPORTS MEDICINE | Facility: CLINIC | Age: 49
End: 2024-06-10
Payer: COMMERCIAL

## 2024-06-10 VITALS
WEIGHT: 152.13 LBS | DIASTOLIC BLOOD PRESSURE: 72 MMHG | SYSTOLIC BLOOD PRESSURE: 119 MMHG | HEART RATE: 68 BPM | BODY MASS INDEX: 20.07 KG/M2

## 2024-06-10 DIAGNOSIS — M77.11 RIGHT LATERAL EPICONDYLITIS: Primary | ICD-10-CM

## 2024-06-10 DIAGNOSIS — M25.521 RIGHT ELBOW PAIN: ICD-10-CM

## 2024-06-10 PROCEDURE — 99499 UNLISTED E&M SERVICE: CPT | Mod: S$GLB,,, | Performed by: PHYSICIAN ASSISTANT

## 2024-06-10 PROCEDURE — 99999 PR PBB SHADOW E&M-EST. PATIENT-LVL III: CPT | Mod: PBBFAC,,, | Performed by: PHYSICIAN ASSISTANT

## 2024-06-10 RX ORDER — HYDROCODONE BITARTRATE AND ACETAMINOPHEN 10; 325 MG/1; MG/1
TABLET ORAL
Qty: 8 TABLET | Refills: 0 | Status: SHIPPED | OUTPATIENT
Start: 2024-06-10

## 2024-06-10 RX ORDER — PROMETHAZINE HYDROCHLORIDE 25 MG/1
25 TABLET ORAL EVERY 6 HOURS PRN
Qty: 8 TABLET | Refills: 0 | Status: SHIPPED | OUTPATIENT
Start: 2024-06-10

## 2024-06-11 ENCOUNTER — TELEPHONE (OUTPATIENT)
Dept: SPORTS MEDICINE | Facility: CLINIC | Age: 49
End: 2024-06-11
Payer: COMMERCIAL

## 2024-06-11 ENCOUNTER — PATIENT MESSAGE (OUTPATIENT)
Dept: SPORTS MEDICINE | Facility: CLINIC | Age: 49
End: 2024-06-11
Payer: COMMERCIAL

## 2024-06-11 RX ORDER — ASPIRIN 81 MG/1
81 TABLET ORAL DAILY
COMMUNITY
Start: 2024-06-11 | End: 2025-06-11

## 2024-06-11 RX ORDER — PREGABALIN 75 MG/1
75 CAPSULE ORAL
OUTPATIENT
Start: 2024-06-11 | End: 2024-06-11

## 2024-06-11 RX ORDER — SODIUM CHLORIDE 9 MG/ML
INJECTION, SOLUTION INTRAVENOUS CONTINUOUS
OUTPATIENT
Start: 2024-06-11

## 2024-06-11 RX ORDER — CEFAZOLIN SODIUM 2 G/50ML
2 SOLUTION INTRAVENOUS
OUTPATIENT
Start: 2024-06-11

## 2024-06-11 NOTE — H&P (VIEW-ONLY)
Jaime CRUZ Ky  is here for a completion of his perioperative paperwork. he  Is scheduled to undergo on     Right elbow     1st stage      1. elbow excision of neuroma, posterior branch, posterior cutaneous nerve   of the forearm (CPT 74335).   2. elbow implantation of nerve into bone or muscle (posterior branch and   posterior cutaneous nerve of the forearm into triceps (CPT 77913) on 7/2/24     He is a healthy individual and does need clearance for this procedure which he has not received.     PAST MEDICAL HISTORY:   Past Medical History:   Diagnosis Date    Bee sting allergy     GERD (gastroesophageal reflux disease)      PAST SURGICAL HISTORY:   Past Surgical History:   Procedure Laterality Date    MOUTH SURGERY      WISDOM TOOTH EXTRACTION       FAMILY HISTORY:   Family History   Problem Relation Name Age of Onset    Cancer Mother adopted 55        lung/smoker    Hypothyroidism Mother adopted     Lupus Father      No Known Problems Brother      Stroke Paternal Grandmother      No Known Problems Paternal Grandfather      No Known Problems Daughter      No Known Problems Son      No Known Problems Paternal Uncle      Allergic rhinitis Neg Hx      Allergies Neg Hx      Angioedema Neg Hx      Asthma Neg Hx      Atopy Neg Hx      Eczema Neg Hx      Immunodeficiency Neg Hx      Rhinitis Neg Hx      Urticaria Neg Hx      Glaucoma Neg Hx      Macular degeneration Neg Hx       SOCIAL HISTORY:   Social History     Socioeconomic History    Marital status:    Tobacco Use    Smoking status: Some Days     Types: Cigars    Smokeless tobacco: Never    Tobacco comments:     [place] [phone]   Substance and Sexual Activity    Alcohol use: Yes     Alcohol/week: 2.0 standard drinks of alcohol     Types: 2 Standard drinks or equivalent per week    Drug use: No    Sexual activity: Yes     Partners: Female     Birth control/protection: I.U.D.     Social Determinants of Health     Financial Resource  Strain: Low Risk  (6/10/2024)    Overall Financial Resource Strain (CARDIA)     Difficulty of Paying Living Expenses: Not hard at all   Food Insecurity: No Food Insecurity (6/10/2024)    Hunger Vital Sign     Worried About Running Out of Food in the Last Year: Never true     Ran Out of Food in the Last Year: Never true   Physical Activity: Sufficiently Active (6/10/2024)    Exercise Vital Sign     Days of Exercise per Week: 5 days     Minutes of Exercise per Session: 60 min   Stress: Stress Concern Present (6/10/2024)    [employer] [place] [employer] - Occupational Stress Questionnaire     Feeling of Stress : To some extent   Housing Stability: Unknown (6/10/2024)    Housing Stability Vital Sign     Unable to Pay for Housing in the Last Year: No       MEDICATIONS:   Current Outpatient Medications:     EPINEPHrine (EPIPEN) 0.3 mg/0.3 mL AtIn, Inject 0.3 mLs (0.3 mg total) into the muscle as needed (Anaphylaxis)., Disp: 2 each, Rfl: 0    ibuprofen (ADVIL,MOTRIN) 200 MG tablet, Take 400 mg by mouth 2 (two) times daily as needed for Pain., Disp: , Rfl:     meloxicam (MOBIC) 15 MG tablet, Take 1 tablet (15 mg total) by mouth once daily., Disp: 30 tablet, Rfl: 1    traZODone (DESYREL) 150 MG tablet, Take 1 tablet (150 mg total) by mouth every evening., Disp: 90 tablet, Rfl: 3    HYDROcodone-acetaminophen (NORCO)  mg per tablet, Take 1 tablet by mouth every 4-6 hours for pain., Disp: 8 tablet, Rfl: 0    promethazine (PHENERGAN) 25 MG tablet, Take 1 tablet (25 mg total) by mouth every 6 (six) hours as needed for Nausea., Disp: 8 tablet, Rfl: 0  ALLERGIES:   Review of patient's allergies indicates:   Allergen Reactions    Venom-wasp Anaphylaxis       VITAL SIGNS: /72   Pulse 68   Wt 69 kg (152 lb 1.9 oz)   BMI 20.07 kg/m²      Risks, indications and benefits of the surgical procedure were discussed with the patient. All questions with regard to surgery, rehab, expected return to functional  activities, activities of daily living and recreational endeavors were answered to his satisfaction.    It was explained to the patient that there may be an increase in surgical risks if the patient has certain co-morbidities such as but not limited to: Obesity, Cardiovascular issues (CHF, CAD, Arrhythmias), chronic pulmonary issues, previous or current neurovascular/neurological issues, previous strokes, diabetes mellitus, previous wound healing issues, previous wound or skin infections, PVD, clotting disorders, if the patient uses chronic steroids, if the patient takes or has immune compromising medications or diseases, or has previously or currently used tobacco products.     The patient verbalized that he/she does not have any additional clotting, bleeding, or blood disorders, other than what is list in her chart on today's review.     Then a brief history and physical exam were performed.    Review of Systems   Constitution: Negative. Negative for chills, fever and night sweats.   HENT: Negative for congestion and headaches.    Eyes: Negative for blurred vision, left vision loss and right vision loss.   Cardiovascular: Negative for chest pain and syncope.   Respiratory: Negative for cough and shortness of breath.    Endocrine: Negative for polydipsia, polyphagia and polyuria.   Hematologic/Lymphatic: Negative for bleeding problem. Does not bruise/bleed easily.   Skin: Negative for dry skin, itching and rash.   Musculoskeletal: Negative for falls and muscle weakness.   Gastrointestinal: Negative for abdominal pain and bowel incontinence.   Genitourinary: Negative for bladder incontinence and nocturia.   Neurological: Negative for disturbances in coordination, loss of balance and seizures.   Psychiatric/Behavioral: Negative for depression. The patient does not have insomnia.    Allergic/Immunologic: Negative for hives and persistent infections.     PHYSICAL EXAM:  GEN: A&Ox3, WD WN NAD  HEENT: WNL  CHEST: CTAB,  no W/R/R  HEART: RRR, no M/R/G  ABD: Soft, NT ND, BS x4 QUADS  MS; See Epic  NEURO: CN II-XII intact       The surgical consent was then reviewed with the patient, who agreed with all the contents of the consent form and it was signed. he was then given the surgery packet to bring with him to surgery [place] for the anesthesia portion of his perioperative paperwork (if needed)   For all physicians except for Dr. Whitehead, we will email and possibly fax the consent forms and booking sheets to [facility].    The patient was given the opportunity to ask questions about the surgical plan and consent form, and once no other questions were asked, I proceeded with the pre-op appointment.    PHYSICAL THERAPY:  TBD    POST OP CARE:instructions were reviewed including care of the wound and dressing after surgery and when he can shower. Patient was told not sleep or lay on there surgical extremity following surgery as this could cause repair damage, tissue damage, or nerve injury.    An extensive amount of time was spent on discussion of the following information based on what type of surgery the patient was having. Patient expressed understanding of the material below:    Shoulder surgery or upper extremity surgery requiring post-op sling:  It was explained to the patient that they should remove their arm from the sling approximately 6 times per day to do full elbow ROM (flexion and extension) and full supination and pronation of the elbow for approximately 5 minutes at a time to help prevent elbow stiffness, nerve pain or problems, or nerve injury. They were told to contact us if they begin having numbness and tingling of there surgical extremity that persists longer then 1 day without relief.     Extremity surgery requiring a splint:   It was explained to patient on how to properly elevated position there extremity to prevent pressure ulcers from occurring. I made sure that the patient understood that that  surgical site may be numb following surgery and prevent them from feeling pressure pain that they would normal feel if a pressure injury was occurring. Pressure ulcers and there causes were discussed with the patient today.     Post-operative splint:  It was explain to the patient that they can contact us at anytime if they feel that there is a problem with their splint or under their splint that needs evaluation. If there is concern, questions, or discomfort with the splint then they can present to either our clinic or the [facility] ED for removal, evaluation, and replacement of the splint.    CRUTCHES OR WALKER: It was explained to the patient that if they are having a lower extremity surgery that they will require either a walker or crutches to ambulate safely with after surgery. It was explained that a cane or other assistive devices are not sufficient to safely ambulate with after surgery. I explained to the patient that I will place an order for them to receive either crutches or a walker after surgery to go home with. It was explained that if they have crutches or a walker at home already, that they are REQUIRED to bring them to the hospital on the day of surgery. It was explained that if they do not have them at the hospital on the day of surgery that they WILL be provided a new pair or crutches or a walker to go home with to ensure ambulation will be safe if the patient needs to stop somewhere on the way home.      If the patient's mobility limitation cannot be sufficiently resolved by the use of crutches then it is necessary for the patient's functional mobility deficit to be sufficiently resolved with the use of a (Rolling Walker or Walker). Patient's mobility limitation significantly impairs their ability to participate in one of more activities of daily living. The use of a (Rolling Walker or Walker) will significantly improve the patient's ability to participate in MRADLS and the patient will  use it on regular basis in the home.      PAIN MANAGEMENT: Jaime Mcpherson was also given a pain management regime, which includes the option of getting a TENS unit given to him by [facility] (if patient chooses) along with the education required for its use. He was also instructed regarding the Polar ice unit or gel ice packs (chosen by patient) that will be in place after surgery and his postoperative pain medications.     Patient understands that Polar Ice machine has to be bought today if they want it. It cannot be bought on day of surgery at surgery center.     PAIN MEDICATION:  Norco 10/325mg 1 po q 4-6 hours prn pain   Phenergan 25 mg one p.o. q.6 hours p.r.n. nausea and vomiting.    DVT prophylaxis was discussed with the patient today including risk factors for developing DVTs and history of DVTs. The patient was asked if any specific recommendations were given from the doctor/s that did pre-operative surgical clearance. The patient was then given an education sheet about DVTs and PE with warning signs and symptoms of both and steps to take if they suspect either of these.    This along with the Modified Caprini risk assessment model for VTE in general surgical patients was used to determine the patient's DVT risk.     From: Brynn MK, Ancelmo DA, Randi SM, et al. Prevention of VTE in nonorthopedic surgical patients: antithrombotic therapy and prevention of thrombosis, 9th ed: American College of Chest Physicians evidence-based clinical practical guidelines. Chest 2012; 141:e227S. Copyright © 2012. Reproduced with permission from the American College of Chest Physicians.    The below listed DVT prophylaxis regimen along with bilateral DELGADO compression stockings will be used post-op. Length of treatment has been determined to be 10-42 days post-op by the above noted Caprini assessment model.     The patient was instructed to buy and take:  Aspirin 81mg QD x 4 weeks for DVT prophylaxis starting on the  morning after surgery.  Patient will also use bilateral TEDs on lower extremities, SCDs during surgery, and early ambulation post-op. If the patient was previously taking 81mg baby aspirin, they were told to not take it starting 5 days prior to surgery and to restart the 81mg aspirin after surgery.       Patient was also told to buy over the counter Prilosec medication if needed and take it once daily for GI protection as long as they are taking NSAIDs or Aspirin.   Patient denies history of seizures.      The patient was told that narcotic pain medications may make them drowsy and instructions were given to not sign legal documents, drive or operate heavy machinery, cars, or equipment while under the influence of narcotic medications. The patient was told and understands that narcotic pain medications should only be used as needed to control pain and that other options of pain control include TENs unit and ice packs/unit.     As there were no other questions to be asked, he was given my business card along with Sosa Ayers MD business card if he has any questions or concerns prior to surgery or in the postop period.

## 2024-06-11 NOTE — TELEPHONE ENCOUNTER
----- Message from Efraín Dick III, PA-C sent at 6/10/2024 10:22 PM CDT -----  Regarding: FW: surgical clearance  Please let the patient know that I spoke with his primary care doctor who said that he needs to come in for a pre-op appt. The last visit he had with her was not a pre-op appt.     This is per Dr. Reed who I messaged.  ----- Message -----  From: Rachel Reed MD  Sent: 6/10/2024   7:13 PM CDT  To: Efraín Dick III, PA-C  Subject: RE: surgical clearance                           This patient did not present for a preop.  He presented in May to establish care.  He mentioned he was going to have a tendon surgery in the future but did not state that this was a preop visit.  Since it has been over 2 months, and most preop so required to see the patient within a month prior to surgery, he will likely need to come in for a preop visit  ----- Message -----  From: Efraín Dick III, PA-C  Sent: 6/10/2024   9:06 AM CDT  To: Rachel Reed MD  Subject: surgical clearance                               Dr. Reed,    Dr. Ayers is recommending surgery for your mutual patient.  Prior to surgery they will need medical clearance completed through your office.     I looked in this patient's chart but I do not see it stated whether the patient is cleared for surgery.     Please let me know as soon as possible if the patient is medically cleared for surgery.    Thank you,     Landen Dick III, PA-C  [facility] - Physician Assistant  [place] [address]  Phone # [phone]  Fax # [phone]

## 2024-06-11 NOTE — H&P
Jaime CRUZ Ky  is here for a completion of his perioperative paperwork. he  Is scheduled to undergo on     Right elbow     1st stage      1. elbow excision of neuroma, posterior branch, posterior cutaneous nerve   of the forearm (CPT 06492).   2. elbow implantation of nerve into bone or muscle (posterior branch and   posterior cutaneous nerve of the forearm into triceps (CPT 17130) on 7/2/24     He is a healthy individual and does need clearance for this procedure which he has not received.     PAST MEDICAL HISTORY:   Past Medical History:   Diagnosis Date    Bee sting allergy     GERD (gastroesophageal reflux disease)      PAST SURGICAL HISTORY:   Past Surgical History:   Procedure Laterality Date    MOUTH SURGERY      WISDOM TOOTH EXTRACTION       FAMILY HISTORY:   Family History   Problem Relation Name Age of Onset    Cancer Mother adopted 55        lung/smoker    Hypothyroidism Mother adopted     Lupus Father      No Known Problems Brother      Stroke Paternal Grandmother      No Known Problems Paternal Grandfather      No Known Problems Daughter      No Known Problems Son      No Known Problems Paternal Uncle      Allergic rhinitis Neg Hx      Allergies Neg Hx      Angioedema Neg Hx      Asthma Neg Hx      Atopy Neg Hx      Eczema Neg Hx      Immunodeficiency Neg Hx      Rhinitis Neg Hx      Urticaria Neg Hx      Glaucoma Neg Hx      Macular degeneration Neg Hx       SOCIAL HISTORY:   Social History     Socioeconomic History    Marital status:    Tobacco Use    Smoking status: Some Days     Types: Cigars    Smokeless tobacco: Never    Tobacco comments:     electric / [place] in [place] [phone]   Substance and Sexual Activity    Alcohol use: Yes     Alcohol/week: 2.0 standard drinks of alcohol     Types: 2 Standard drinks or equivalent per week    Drug use: No    Sexual activity: Yes     Partners: Female     Birth control/protection: I.U.D.     Social Determinants of Health     Financial Resource  Strain: Low Risk  (6/10/2024)    Overall Financial Resource Strain (CARDIA)     Difficulty of Paying Living Expenses: Not hard at all   Food Insecurity: No Food Insecurity (6/10/2024)    Hunger Vital Sign     Worried About Running Out of Food in the Last Year: Never true     Ran Out of Food in the Last Year: Never true   Physical Activity: Sufficiently Active (6/10/2024)    Exercise Vital Sign     Days of Exercise per Week: 5 days     Minutes of Exercise per Session: 60 min   Stress: Stress Concern Present (6/10/2024)    [employer] [place] [employer] - Occupational Stress Questionnaire     Feeling of Stress : To some extent   Housing Stability: Unknown (6/10/2024)    Housing Stability Vital Sign     Unable to Pay for Housing in the Last Year: No       MEDICATIONS:   Current Outpatient Medications:     EPINEPHrine (EPIPEN) 0.3 mg/0.3 mL AtIn, Inject 0.3 mLs (0.3 mg total) into the muscle as needed (Anaphylaxis)., Disp: 2 each, Rfl: 0    ibuprofen (ADVIL,MOTRIN) 200 MG tablet, Take 400 mg by mouth 2 (two) times daily as needed for Pain., Disp: , Rfl:     meloxicam (MOBIC) 15 MG tablet, Take 1 tablet (15 mg total) by mouth once daily., Disp: 30 tablet, Rfl: 1    traZODone (DESYREL) 150 MG tablet, Take 1 tablet (150 mg total) by mouth every evening., Disp: 90 tablet, Rfl: 3    HYDROcodone-acetaminophen (NORCO)  mg per tablet, Take 1 tablet by mouth every 4-6 hours for pain., Disp: 8 tablet, Rfl: 0    promethazine (PHENERGAN) 25 MG tablet, Take 1 tablet (25 mg total) by mouth every 6 (six) hours as needed for Nausea., Disp: 8 tablet, Rfl: 0  ALLERGIES:   Review of patient's allergies indicates:   Allergen Reactions    Venom-wasp Anaphylaxis       VITAL SIGNS: /72   Pulse 68   Wt 69 kg (152 lb 1.9 oz)   BMI 20.07 kg/m²      Risks, indications and benefits of the surgical procedure were discussed with the patient. All questions with regard to surgery, rehab, expected return to functional  activities, activities of daily living and recreational endeavors were answered to his satisfaction.    It was explained to the patient that there may be an increase in surgical risks if the patient has certain co-morbidities such as but not limited to: Obesity, Cardiovascular issues (CHF, CAD, Arrhythmias), chronic pulmonary issues, previous or current neurovascular/neurological issues, previous strokes, diabetes mellitus, previous wound healing issues, previous wound or skin infections, PVD, clotting disorders, if the patient uses chronic steroids, if the patient takes or has immune compromising medications or diseases, or has previously or currently used tobacco products.     The patient verbalized that he/she does not have any additional clotting, bleeding, or blood disorders, other than what is list in her chart on today's review.     Then a brief history and physical exam were performed.    Review of Systems   Constitution: Negative. Negative for chills, fever and night sweats.   HENT: Negative for congestion and headaches.    Eyes: Negative for blurred vision, left vision loss and right vision loss.   Cardiovascular: Negative for chest pain and syncope.   Respiratory: Negative for cough and shortness of breath.    Endocrine: Negative for polydipsia, polyphagia and polyuria.   Hematologic/Lymphatic: Negative for bleeding problem. Does not bruise/bleed easily.   Skin: Negative for dry skin, itching and rash.   Musculoskeletal: Negative for falls and muscle weakness.   Gastrointestinal: Negative for abdominal pain and bowel incontinence.   Genitourinary: Negative for bladder incontinence and nocturia.   Neurological: Negative for disturbances in coordination, loss of balance and seizures.   Psychiatric/Behavioral: Negative for depression. The patient does not have insomnia.    Allergic/Immunologic: Negative for hives and persistent infections.     PHYSICAL EXAM:  GEN: A&Ox3, WD WN NAD  HEENT: WNL  CHEST: CTAB,  no W/R/R  HEART: RRR, no M/R/G  ABD: Soft, NT ND, BS x4 QUADS  MS; See Epic  NEURO: CN II-XII intact       The surgical consent was then reviewed with the patient, who agreed with all the contents of the consent form and it was signed. he was then given the surgery packet to bring with him to surgery [place] for the anesthesia portion of his perioperative paperwork (if needed)   For all physicians except for Dr. Whitehead, we will email and possibly fax the consent forms and booking sheets to [facility].    The patient was given the opportunity to ask questions about the surgical plan and consent form, and once no other questions were asked, I proceeded with the pre-op appointment.    PHYSICAL THERAPY:  TBD    POST OP CARE:instructions were reviewed including care of the wound and dressing after surgery and when he can shower. Patient was told not sleep or lay on there surgical extremity following surgery as this could cause repair damage, tissue damage, or nerve injury.    An extensive amount of time was spent on discussion of the following information based on what type of surgery the patient was having. Patient expressed understanding of the material below:    Shoulder surgery or upper extremity surgery requiring post-op sling:  It was explained to the patient that they should remove their arm from the sling approximately 6 times per day to do full elbow ROM (flexion and extension) and full supination and pronation of the elbow for approximately 5 minutes at a time to help prevent elbow stiffness, nerve pain or problems, or nerve injury. They were told to contact us if they begin having numbness and tingling of there surgical extremity that persists longer then 1 day without relief.     Extremity surgery requiring a splint:   It was explained to patient on how to properly elevated position there extremity to prevent pressure ulcers from occurring. I made sure that the patient understood that that  surgical site may be numb following surgery and prevent them from feeling pressure pain that they would normal feel if a pressure injury was occurring. Pressure ulcers and there causes were discussed with the patient today.     Post-operative splint:  It was explain to the patient that they can contact us at anytime if they feel that there is a problem with their splint or under their splint that needs evaluation. If there is concern, questions, or discomfort with the splint then they can present to either our clinic or the [facility] ED for removal, evaluation, and replacement of the splint.    CRUTCHES OR WALKER: It was explained to the patient that if they are having a lower extremity surgery that they will require either a walker or crutches to ambulate safely with after surgery. It was explained that a cane or other assistive devices are not sufficient to safely ambulate with after surgery. I explained to the patient that I will place an order for them to receive either crutches or a walker after surgery to go home with. It was explained that if they have crutches or a walker at home already, that they are REQUIRED to bring them to the hospital on the day of surgery. It was explained that if they do not have them at the hospital on the day of surgery that they WILL be provided a new pair or crutches or a walker to go home with to ensure ambulation will be safe if the patient needs to stop somewhere on the way home.      If the patient's mobility limitation cannot be sufficiently resolved by the use of crutches then it is necessary for the patient's functional mobility deficit to be sufficiently resolved with the use of a (Rolling Walker or Walker). Patient's mobility limitation significantly impairs their ability to participate in one of more activities of daily living. The use of a (Rolling Walker or Walker) will significantly improve the patient's ability to participate in MRADLS and the patient will  use it on regular basis in the home.      PAIN MANAGEMENT: Jaime Mcpherson was also given a pain management regime, which includes the option of getting a TENS unit given to him by [facility] (if patient chooses) along with the education required for its use. He was also instructed regarding the Polar ice unit or gel ice packs (chosen by patient) that will be in place after surgery and his postoperative pain medications.     Patient understands that Polar Ice machine has to be bought today if they want it. It cannot be bought on day of surgery at surgery center.     PAIN MEDICATION:  Norco 10/325mg 1 po q 4-6 hours prn pain   Phenergan 25 mg one p.o. q.6 hours p.r.n. nausea and vomiting.    DVT prophylaxis was discussed with the patient today including risk factors for developing DVTs and history of DVTs. The patient was asked if any specific recommendations were given from the doctor/s that did pre-operative surgical clearance. The patient was then given an education sheet about DVTs and PE with warning signs and symptoms of both and steps to take if they suspect either of these.    This along with the Modified Caprini risk assessment model for VTE in general surgical patients was used to determine the patient's DVT risk.     From: Brynn MK, Ancelmo DA, Randi SM, et al. Prevention of VTE in nonorthopedic surgical patients: antithrombotic therapy and prevention of thrombosis, 9th ed: American College of Chest Physicians evidence-based clinical practical guidelines. Chest 2012; 141:e227S. Copyright © 2012. Reproduced with permission from the American College of Chest Physicians.    The below listed DVT prophylaxis regimen along with bilateral DELGADO compression stockings will be used post-op. Length of treatment has been determined to be 10-42 days post-op by the above noted Caprini assessment model.     The patient was instructed to buy and take:  Aspirin 81mg QD x 4 weeks for DVT prophylaxis starting on the  morning after surgery.  Patient will also use bilateral TEDs on lower extremities, SCDs during surgery, and early ambulation post-op. If the patient was previously taking 81mg baby aspirin, they were told to not take it starting 5 days prior to surgery and to restart the 81mg aspirin after surgery.       Patient was also told to buy over the counter Prilosec medication if needed and take it once daily for GI protection as long as they are taking NSAIDs or Aspirin.   Patient denies history of seizures.      The patient was told that narcotic pain medications may make them drowsy and instructions were given to not sign legal documents, drive or operate heavy machinery, cars, or equipment while under the influence of narcotic medications. The patient was told and understands that narcotic pain medications should only be used as needed to control pain and that other options of pain control include TENs unit and ice packs/unit.     As there were no other questions to be asked, he was given my business card along with Sosa Ayers MD business card if he has any questions or concerns prior to surgery or in the postop period.

## 2024-06-12 ENCOUNTER — PATIENT MESSAGE (OUTPATIENT)
Dept: FAMILY MEDICINE | Facility: CLINIC | Age: 49
End: 2024-06-12
Payer: COMMERCIAL

## 2024-06-20 ENCOUNTER — HOSPITAL ENCOUNTER (OUTPATIENT)
Dept: RADIOLOGY | Facility: HOSPITAL | Age: 49
Discharge: HOME | End: 2024-06-20
Attending: FAMILY MEDICINE
Payer: COMMERCIAL

## 2024-06-20 ENCOUNTER — OFFICE VISIT (OUTPATIENT)
Dept: FAMILY MEDICINE | Facility: CLINIC | Age: 49
End: 2024-06-20
Payer: COMMERCIAL

## 2024-06-20 ENCOUNTER — HOSPITAL ENCOUNTER (OUTPATIENT)
Dept: CARDIOLOGY | Facility: HOSPITAL | Age: 49
Discharge: HOME | End: 2024-06-20
Attending: FAMILY MEDICINE
Payer: COMMERCIAL

## 2024-06-20 VITALS
DIASTOLIC BLOOD PRESSURE: 82 MMHG | RESPIRATION RATE: 14 BRPM | SYSTOLIC BLOOD PRESSURE: 112 MMHG | BODY MASS INDEX: 20.33 KG/M2 | WEIGHT: 153.38 LBS | OXYGEN SATURATION: 96 % | HEART RATE: 93 BPM | HEIGHT: 73 IN

## 2024-06-20 DIAGNOSIS — Z01.810 PREOP CARDIOVASCULAR EXAM: ICD-10-CM

## 2024-06-20 DIAGNOSIS — Z01.818 PREOP EXAMINATION: ICD-10-CM

## 2024-06-20 DIAGNOSIS — Z01.810 PREOP CARDIOVASCULAR EXAM: Primary | ICD-10-CM

## 2024-06-20 LAB
OHS QRS DURATION: 84 MS
OHS QTC CALCULATION: 366 MS

## 2024-06-20 PROCEDURE — 93005 ELECTROCARDIOGRAM TRACING: CPT

## 2024-06-20 PROCEDURE — 99999 PR PBB SHADOW E&M-EST. PATIENT-LVL IV: CPT | Mod: PBBFAC,,, | Performed by: FAMILY MEDICINE

## 2024-06-20 PROCEDURE — 71046 X-RAY EXAM CHEST 2 VIEWS: CPT | Mod: TC

## 2024-06-20 PROCEDURE — 3008F BODY MASS INDEX DOCD: CPT | Mod: S$GLB,,, | Performed by: FAMILY MEDICINE

## 2024-06-20 PROCEDURE — 3044F HG A1C LEVEL LT 7.0%: CPT | Mod: S$GLB,,, | Performed by: FAMILY MEDICINE

## 2024-06-20 PROCEDURE — 99214 OFFICE O/P EST MOD 30 MIN: CPT | Mod: S$GLB,,, | Performed by: FAMILY MEDICINE

## 2024-06-20 PROCEDURE — 3074F SYST BP LT 130 MM HG: CPT | Mod: S$GLB,,, | Performed by: FAMILY MEDICINE

## 2024-06-20 PROCEDURE — 71046 X-RAY EXAM CHEST 2 VIEWS: CPT | Mod: 26,,, | Performed by: RADIOLOGY

## 2024-06-20 PROCEDURE — 1159F MED LIST DOCD IN RCRD: CPT | Mod: S$GLB,,, | Performed by: FAMILY MEDICINE

## 2024-06-20 PROCEDURE — 3079F DIAST BP 80-89 MM HG: CPT | Mod: S$GLB,,, | Performed by: FAMILY MEDICINE

## 2024-06-20 NOTE — PROGRESS NOTES
Subjective:       Patient ID: Jaime Mcpherson is a 48 y.o. male.    Chief Complaint: Pre-op Exam (Nerve surgery on right arm on 7/2/24)      Past Medical History:   Diagnosis Date    Bee sting allergy     GERD (gastroesophageal reflux disease)        Past Surgical History:   Procedure Laterality Date    MOUTH SURGERY      WISDOM TOOTH EXTRACTION          Social History     Socioeconomic History    Marital status:    Tobacco Use    Smoking status: Some Days     Types: Cigars    Smokeless tobacco: Never    Tobacco comments:     [place] [phone]   Substance and Sexual Activity    Alcohol use: Yes     Alcohol/week: 2.0 standard drinks of alcohol     Types: 2 Standard drinks or equivalent per week    Drug use: No    Sexual activity: Yes     Partners: Female     Birth control/protection: I.U.D.     Social Determinants of Health     Financial Resource Strain: Low Risk  (6/10/2024)    Overall Financial Resource Strain (CARDIA)     Difficulty of Paying Living Expenses: Not hard at all   Food Insecurity: No Food Insecurity (6/10/2024)    Hunger Vital Sign     Worried About Running Out of Food in the Last Year: Never true     Ran Out of Food in the Last Year: Never true   Physical Activity: Sufficiently Active (6/10/2024)    Exercise Vital Sign     Days of Exercise per Week: 5 days     Minutes of Exercise per Session: 60 min   Stress: Stress Concern Present (6/10/2024)    [employer] [place] [employer] - Occupational Stress Questionnaire     Feeling of Stress : To some extent   Housing Stability: Unknown (6/10/2024)    Housing Stability Vital Sign     Unable to Pay for Housing in the Last Year: No       Family History   Problem Relation Name Age of Onset    Cancer Mother adopted 55        lung/smoker    Hypothyroidism Mother adopted     Lupus Father      No Known Problems Brother      Stroke Paternal Grandmother      No Known Problems Paternal Grandfather      No Known Problems  Daughter      No Known Problems Son      No Known Problems Paternal Uncle      Allergic rhinitis Neg Hx      Allergies Neg Hx      Angioedema Neg Hx      Asthma Neg Hx      Atopy Neg Hx      Eczema Neg Hx      Immunodeficiency Neg Hx      Rhinitis Neg Hx      Urticaria Neg Hx      Glaucoma Neg Hx      Macular degeneration Neg Hx         Review of patient's allergies indicates:   Allergen Reactions    Venom-wasp Anaphylaxis          Current Outpatient Medications:     aspirin (ECOTRIN) 81 MG EC tablet, Take 1 tablet (81 mg total) by mouth once daily. For 4 weeks starting the day after surgery., Disp: , Rfl:     EPINEPHrine (EPIPEN) 0.3 mg/0.3 mL AtIn, Inject 0.3 mLs (0.3 mg total) into the muscle as needed (Anaphylaxis)., Disp: 2 each, Rfl: 0    ibuprofen (ADVIL,MOTRIN) 200 MG tablet, Take 400 mg by mouth 2 (two) times daily as needed for Pain., Disp: , Rfl:     meloxicam (MOBIC) 15 MG tablet, Take 1 tablet (15 mg total) by mouth once daily., Disp: 30 tablet, Rfl: 1    traZODone (DESYREL) 150 MG tablet, Take 1 tablet (150 mg total) by mouth every evening., Disp: 90 tablet, Rfl: 3    HYDROcodone-acetaminophen (NORCO)  mg per tablet, Take 1 tablet by mouth every 4-6 hours for pain. (Patient not taking: Reported on 6/20/2024), Disp: 8 tablet, Rfl: 0    promethazine (PHENERGAN) 25 MG tablet, Take 1 tablet (25 mg total) by mouth every 6 (six) hours as needed for Nausea. (Patient not taking: Reported on 6/20/2024), Disp: 8 tablet, Rfl: 0    Mr Mcpherson is a 49 yo male her for a pre op for an ulnar entrapment. He is having it done by Dr Ayers in [place] on July 2, 2024. He has no significant medical issues that would prohibit this surgical intervention. The routine labs will be done as well as a CXR and an EKG.       Review of Systems   Constitutional:  Negative for activity change, appetite change, chills, diaphoresis and fever.   HENT:  Negative for congestion, ear pain, postnasal drip, rhinorrhea and sore throat.     Eyes:  Negative for pain, discharge, redness and itching.   Respiratory:  Negative for cough and shortness of breath.    Cardiovascular:  Negative for chest pain, palpitations and leg swelling.   Gastrointestinal:  Negative for abdominal distention, abdominal pain, constipation, diarrhea and nausea.   Genitourinary:  Negative for difficulty urinating, dysuria, frequency and urgency.   Skin:  Negative for color change, rash and wound.   Neurological:         Right ulnar entrapment   All other systems reviewed and are negative.      Objective:      Physical Exam  Vitals and nursing note reviewed.   Constitutional:       Appearance: Normal appearance. He is normal weight.   HENT:      Head: Normocephalic.      Nose: Nose normal.   Eyes:      Extraocular Movements: Extraocular movements intact.      Conjunctiva/sclera: Conjunctivae normal.      Pupils: Pupils are equal, round, and reactive to light.   Cardiovascular:      Rate and Rhythm: Normal rate and regular rhythm.      Heart sounds: Normal heart sounds. No murmur heard.  Pulmonary:      Effort: Pulmonary effort is normal. No respiratory distress.      Breath sounds: Normal breath sounds.   Musculoskeletal:         General: Tenderness present.      Cervical back: Normal range of motion and neck supple.      Comments: Right ulnar groove   Skin:     General: Skin is warm and dry.   Neurological:      General: No focal deficit present.      Mental Status: He is alert and oriented to person, place, and time.   Psychiatric:         Mood and Affect: Mood normal.         Behavior: Behavior normal.         Assessment:       1. Preop cardiovascular exam    2. Preop examination        Plan:         Preop cardiovascular exam  -     X-Ray Chest PA And Lateral; Future; Expected date: 06/20/2024  -     Cancel: SCHEDULED EKG 12-LEAD (to [place]); Future  -     SCHEDULED EKG 12-LEAD (to [place]); Future    Preop examination  -     CBC auto differential; Future; Expected date:  06/20/2024  -     Comprehensive metabolic panel; Future; Expected date: 06/20/2024  -     X-Ray Chest PA And Lateral; Future; Expected date: 06/20/2024  -     SCHEDULED EKG 12-LEAD (to [place]); Future      I recommend use of standard pre-op and post-op precautions for this patient.   In my opinion, based on this limited assessment, he is medically optimized for this procedure, and can proceed without further evaluation.     Risks, benefits, and side effects were discussed with the patient. All questions were answered to the fullest satisfaction of the patient, and pt verbalized understanding and agreement to treatment plan. Pt was to call with any new or worsening symptoms, or present to the ER.        MD cecilia Thomas is a 47 YO MALE HERE

## 2024-06-26 ENCOUNTER — PATIENT MESSAGE (OUTPATIENT)
Dept: PREADMISSION TESTING | Facility: HOSPITAL | Age: 49
End: 2024-06-26
Payer: COMMERCIAL

## 2024-06-26 NOTE — ANESTHESIA PAT ROS NOTE
06/26/2024  Jaime Mcpherson is a 48 y.o., male.      Pre-op Assessment    I have reviewed the Patient Summary Reports.       I have reviewed the Medications.     Review of Systems  Anesthesia Hx:  No problems with previous Anesthesia               Denies Personal Hx of Anesthesia complications.                    Social:  Smoker, Social Alcohol Use Smokes cigars some days      Hematology/Oncology:  Hematology Normal   Oncology Normal                                   EENT/Dental:   H/O Bee sting allergy,  Mouth surgery, [place] Tooth Extraction          Cardiovascular:  Cardiovascular Normal Exercise tolerance: good       Denies CAD.       Denies Angina.       Denies BEJARANO.  ECG has been reviewed.                          Pulmonary:  Pulmonary Normal   Denies COPD.  Denies Asthma.   Denies Shortness of breath.                  Renal/:  Renal/ Normal  Denies Chronic Renal Disease.                Hepatic/GI:     GERD, well controlled Denies Liver Disease.            Musculoskeletal:     Right lateral epicondylitis            Neurological:  Neurology Normal      Denies Headaches. Denies Seizures.                                Endocrine:  Endocrine Normal Denies Diabetes. Denies Hypothyroidism.          Psych:  Psychiatric Normal                   Past Medical History:   Diagnosis Date    Bee sting allergy     GERD (gastroesophageal reflux disease)      Past Surgical History:   Procedure Laterality Date    MOUTH SURGERY      WISDOM TOOTH EXTRACTION         Anesthesia Assessment: Preoperative EQUATION    Planned Procedure: Procedure(s) (LRB):  EXCISION, NEUROMA (Right)  INSERTION-IMPLANT (Right)  Requested Anesthesia Type:Monitor Anesthesia Care  Surgeon: Sosa Ayers MD  Service: Orthopedics  Known or anticipated Date of Surgery:7/2/2024    Surgeon notes: reviewed    Electronic QUestionnaire Assessment  completed via nurse interview with patient.        Triage considerations:     The patient has no apparent active cardiac condition (No unstable coronary Syndrome such as severe unstable angina or recent [<1 month] myocardial infarction, decompensated CHF, severe valvular   disease or significant arrhythmia)    Previous anesthesia records:No problems and Not available    Last PCP note: within 1 month , within [facility]   Subspecialty notes: n/a    Other important co-morbidities: Smoker       EKG 6/20/2024:  Vent. Rate : 056 BPM     Atrial Rate : 056 BPM      P-R Int : 258 ms          QRS Dur : 084 ms       QT Int : 380 ms       P-R-T Axes : 078 072 070 degrees      QTc Int : 366 ms   Sinus bradycardia with 1st degree A-V block   Otherwise normal ECG   No previous ECGs available   Confirmed by Randall Draper MD ([id number]) on 6/20/2024 4:02:33 PM        Tests already available:  Results have been reviewed.             Instructions given. (See in Nurse's note)  Preop medication instructions sent via portal message.     Optimization:  Anesthesia Preop Clinic Assessment Not Indicated    Medical Opinion Indicated       Sub-specialist consult indicated: No      Plan: Consultation:Patient's PCP for a statement of optimization             Patient  has previously scheduled Medical Appointment: 6/20/2024    Navigation: Patient is medically optimized by PCP for this procedure, and can proceed without further evaluation.       Ht: 6'1  Wt: 69.6 kg (153 lb)  BMI: 20.24

## 2024-07-01 ENCOUNTER — TELEPHONE (OUTPATIENT)
Dept: SPORTS MEDICINE | Facility: CLINIC | Age: 49
End: 2024-07-01
Payer: COMMERCIAL

## 2024-07-02 ENCOUNTER — ANESTHESIA (OUTPATIENT)
Dept: SURGERY | Facility: HOSPITAL | Age: 49
End: 2024-07-02
Payer: COMMERCIAL

## 2024-07-02 ENCOUNTER — HOSPITAL ENCOUNTER (OUTPATIENT)
Facility: HOSPITAL | Age: 49
Discharge: HOME | End: 2024-07-02
Attending: ORTHOPAEDIC SURGERY | Admitting: ORTHOPAEDIC SURGERY
Payer: COMMERCIAL

## 2024-07-02 ENCOUNTER — ANESTHESIA EVENT (OUTPATIENT)
Dept: SURGERY | Facility: HOSPITAL | Age: 49
End: 2024-07-02
Payer: COMMERCIAL

## 2024-07-02 ENCOUNTER — PATIENT MESSAGE (OUTPATIENT)
Dept: SURGERY | Facility: HOSPITAL | Age: 49
End: 2024-07-02
Payer: COMMERCIAL

## 2024-07-02 VITALS
BODY MASS INDEX: 20.28 KG/M2 | HEART RATE: 54 BPM | SYSTOLIC BLOOD PRESSURE: 133 MMHG | HEIGHT: 73 IN | WEIGHT: 153 LBS | TEMPERATURE: 98 F | RESPIRATION RATE: 18 BRPM | DIASTOLIC BLOOD PRESSURE: 56 MMHG | OXYGEN SATURATION: 98 %

## 2024-07-02 DIAGNOSIS — M77.11 RIGHT LATERAL EPICONDYLITIS: ICD-10-CM

## 2024-07-02 DIAGNOSIS — M25.521 RIGHT ELBOW PAIN: ICD-10-CM

## 2024-07-02 PROCEDURE — 36000706: Performed by: ORTHOPAEDIC SURGERY

## 2024-07-02 PROCEDURE — 63600175 PHARM REV CODE 636 W HCPCS: Performed by: ORTHOPAEDIC SURGERY

## 2024-07-02 PROCEDURE — 37000008 HC ANESTHESIA 1ST 15 MINUTES: Performed by: ORTHOPAEDIC SURGERY

## 2024-07-02 PROCEDURE — 25000003 PHARM REV CODE 250: Performed by: PHYSICIAN ASSISTANT

## 2024-07-02 PROCEDURE — 36000707: Performed by: ORTHOPAEDIC SURGERY

## 2024-07-02 PROCEDURE — 63600175 PHARM REV CODE 636 W HCPCS: Performed by: NURSE ANESTHETIST, CERTIFIED REGISTERED

## 2024-07-02 PROCEDURE — 27201423 OPTIME MED/SURG SUP & DEVICES STERILE SUPPLY: Performed by: ORTHOPAEDIC SURGERY

## 2024-07-02 PROCEDURE — 64784 REMOVE NERVE LESION: CPT | Mod: RT,,, | Performed by: ORTHOPAEDIC SURGERY

## 2024-07-02 PROCEDURE — 99900035 HC TECH TIME PER 15 MIN (STAT)

## 2024-07-02 PROCEDURE — 94761 N-INVAS EAR/PLS OXIMETRY MLT: CPT

## 2024-07-02 PROCEDURE — 25000003 PHARM REV CODE 250: Performed by: NURSE ANESTHETIST, CERTIFIED REGISTERED

## 2024-07-02 PROCEDURE — 71000033 HC RECOVERY, INTIAL HOUR: Performed by: ORTHOPAEDIC SURGERY

## 2024-07-02 PROCEDURE — 25000003 PHARM REV CODE 250: Performed by: ORTHOPAEDIC SURGERY

## 2024-07-02 PROCEDURE — 64787 IMPLANT NERVE END: CPT | Mod: RT,,, | Performed by: ORTHOPAEDIC SURGERY

## 2024-07-02 PROCEDURE — 37000009 HC ANESTHESIA EA ADD 15 MINS: Performed by: ORTHOPAEDIC SURGERY

## 2024-07-02 PROCEDURE — 71000015 HC POSTOP RECOV 1ST HR: Performed by: ORTHOPAEDIC SURGERY

## 2024-07-02 RX ORDER — DEXAMETHASONE SODIUM PHOSPHATE 4 MG/ML
INJECTION, SOLUTION INTRA-ARTICULAR; INTRALESIONAL; INTRAMUSCULAR; INTRAVENOUS; SOFT TISSUE
Status: DISCONTINUED | OUTPATIENT
Start: 2024-07-02 | End: 2024-07-02

## 2024-07-02 RX ORDER — KETOROLAC TROMETHAMINE 30 MG/ML
INJECTION, SOLUTION INTRAMUSCULAR; INTRAVENOUS
Status: DISCONTINUED | OUTPATIENT
Start: 2024-07-02 | End: 2024-07-02 | Stop reason: HOSPADM

## 2024-07-02 RX ORDER — PROMETHAZINE HYDROCHLORIDE 25 MG/1
25 TABLET ORAL EVERY 6 HOURS PRN
Status: DISCONTINUED | OUTPATIENT
Start: 2024-07-02 | End: 2024-07-02 | Stop reason: HOSPADM

## 2024-07-02 RX ORDER — ONDANSETRON HYDROCHLORIDE 2 MG/ML
4 INJECTION, SOLUTION INTRAVENOUS EVERY 12 HOURS PRN
Status: DISCONTINUED | OUTPATIENT
Start: 2024-07-02 | End: 2024-07-02 | Stop reason: HOSPADM

## 2024-07-02 RX ORDER — ONDANSETRON HYDROCHLORIDE 2 MG/ML
4 INJECTION, SOLUTION INTRAVENOUS DAILY PRN
Status: DISCONTINUED | OUTPATIENT
Start: 2024-07-02 | End: 2024-07-02 | Stop reason: HOSPADM

## 2024-07-02 RX ORDER — DEXMEDETOMIDINE HYDROCHLORIDE 100 UG/ML
INJECTION, SOLUTION INTRAVENOUS
Status: DISCONTINUED | OUTPATIENT
Start: 2024-07-02 | End: 2024-07-02

## 2024-07-02 RX ORDER — SODIUM CHLORIDE 0.9 % (FLUSH) 0.9 %
10 SYRINGE (ML) INJECTION
Status: DISCONTINUED | OUTPATIENT
Start: 2024-07-02 | End: 2024-07-02 | Stop reason: HOSPADM

## 2024-07-02 RX ORDER — KETAMINE HYDROCHLORIDE 100 MG/ML
INJECTION, SOLUTION INTRAMUSCULAR; INTRAVENOUS
Status: DISCONTINUED | OUTPATIENT
Start: 2024-07-02 | End: 2024-07-02 | Stop reason: HOSPADM

## 2024-07-02 RX ORDER — ROPIVACAINE HYDROCHLORIDE 5 MG/ML
INJECTION, SOLUTION EPIDURAL; INFILTRATION; PERINEURAL
Status: DISCONTINUED | OUTPATIENT
Start: 2024-07-02 | End: 2024-07-02 | Stop reason: HOSPADM

## 2024-07-02 RX ORDER — CEFAZOLIN 2 G/1
INJECTION, POWDER, FOR SOLUTION INTRAMUSCULAR; INTRAVENOUS
Status: DISCONTINUED | OUTPATIENT
Start: 2024-07-02 | End: 2024-07-02

## 2024-07-02 RX ORDER — PROPOFOL 10 MG/ML
VIAL (ML) INTRAVENOUS CONTINUOUS PRN
Status: DISCONTINUED | OUTPATIENT
Start: 2024-07-02 | End: 2024-07-02

## 2024-07-02 RX ORDER — TRAMADOL HYDROCHLORIDE 50 MG/1
100 TABLET ORAL EVERY 6 HOURS PRN
Status: DISCONTINUED | OUTPATIENT
Start: 2024-07-02 | End: 2024-07-02 | Stop reason: HOSPADM

## 2024-07-02 RX ORDER — OXYCODONE HYDROCHLORIDE 5 MG/1
5 TABLET ORAL
Status: DISCONTINUED | OUTPATIENT
Start: 2024-07-02 | End: 2024-07-02 | Stop reason: HOSPADM

## 2024-07-02 RX ORDER — FENTANYL CITRATE 50 UG/ML
25 INJECTION, SOLUTION INTRAMUSCULAR; INTRAVENOUS EVERY 5 MIN PRN
Status: DISCONTINUED | OUTPATIENT
Start: 2024-07-02 | End: 2024-07-02 | Stop reason: HOSPADM

## 2024-07-02 RX ORDER — LIDOCAINE HYDROCHLORIDE 20 MG/ML
INJECTION INTRAVENOUS
Status: DISCONTINUED | OUTPATIENT
Start: 2024-07-02 | End: 2024-07-02

## 2024-07-02 RX ORDER — PREGABALIN 75 MG/1
75 CAPSULE ORAL
Status: COMPLETED | OUTPATIENT
Start: 2024-07-02 | End: 2024-07-02

## 2024-07-02 RX ORDER — OXYCODONE HYDROCHLORIDE 5 MG/1
10 TABLET ORAL EVERY 4 HOURS PRN
Status: DISCONTINUED | OUTPATIENT
Start: 2024-07-02 | End: 2024-07-02 | Stop reason: HOSPADM

## 2024-07-02 RX ORDER — SODIUM CHLORIDE 9 MG/ML
INJECTION, SOLUTION INTRAVENOUS CONTINUOUS
Status: DISCONTINUED | OUTPATIENT
Start: 2024-07-02 | End: 2024-07-02 | Stop reason: HOSPADM

## 2024-07-02 RX ORDER — MIDAZOLAM HYDROCHLORIDE 1 MG/ML
INJECTION INTRAMUSCULAR; INTRAVENOUS
Status: DISCONTINUED | OUTPATIENT
Start: 2024-07-02 | End: 2024-07-02

## 2024-07-02 RX ORDER — MORPHINE SULFATE 2 MG/ML
2 INJECTION, SOLUTION INTRAMUSCULAR; INTRAVENOUS EVERY 10 MIN PRN
Status: DISCONTINUED | OUTPATIENT
Start: 2024-07-02 | End: 2024-07-02 | Stop reason: HOSPADM

## 2024-07-02 RX ORDER — FAMOTIDINE 10 MG/ML
INJECTION INTRAVENOUS
Status: DISCONTINUED | OUTPATIENT
Start: 2024-07-02 | End: 2024-07-02

## 2024-07-02 RX ORDER — HALOPERIDOL 5 MG/ML
0.5 INJECTION INTRAMUSCULAR EVERY 10 MIN PRN
Status: DISCONTINUED | OUTPATIENT
Start: 2024-07-02 | End: 2024-07-02 | Stop reason: HOSPADM

## 2024-07-02 RX ADMIN — DEXAMETHASONE SODIUM PHOSPHATE 8 MG: 4 INJECTION, SOLUTION INTRAMUSCULAR; INTRAVENOUS at 01:07

## 2024-07-02 RX ADMIN — SODIUM CHLORIDE: 0.9 INJECTION, SOLUTION INTRAVENOUS at 01:07

## 2024-07-02 RX ADMIN — PROPOFOL 125 MCG/KG/MIN: 10 INJECTION, EMULSION INTRAVENOUS at 01:07

## 2024-07-02 RX ADMIN — PREGABALIN 75 MG: 75 CAPSULE ORAL at 11:07

## 2024-07-02 RX ADMIN — LIDOCAINE HYDROCHLORIDE 100 MG: 20 INJECTION INTRAVENOUS at 01:07

## 2024-07-02 RX ADMIN — MIDAZOLAM HYDROCHLORIDE 2 MG: 2 INJECTION, SOLUTION INTRAMUSCULAR; INTRAVENOUS at 01:07

## 2024-07-02 RX ADMIN — CEFAZOLIN 2 G: 2 INJECTION, POWDER, FOR SOLUTION INTRAMUSCULAR; INTRAVENOUS at 02:07

## 2024-07-02 RX ADMIN — DEXMEDETOMIDINE 12 MCG: 100 INJECTION, SOLUTION, CONCENTRATE INTRAVENOUS at 01:07

## 2024-07-02 RX ADMIN — SODIUM CHLORIDE, SODIUM GLUCONATE, SODIUM ACETATE, POTASSIUM CHLORIDE, MAGNESIUM CHLORIDE, SODIUM PHOSPHATE, DIBASIC, AND POTASSIUM PHOSPHATE: .53; .5; .37; .037; .03; .012; .00082 INJECTION, SOLUTION INTRAVENOUS at 01:07

## 2024-07-02 RX ADMIN — FAMOTIDINE 20 MG: 10 INJECTION, SOLUTION INTRAVENOUS at 01:07

## 2024-07-02 RX ADMIN — SODIUM CHLORIDE: 9 INJECTION, SOLUTION INTRAVENOUS at 11:07

## 2024-07-02 NOTE — DISCHARGE SUMMARY
[place] - Surgery ([facility])  Brief Operative Note    Surgery Date: 7/2/2024     Surgeons and Role:     * Sosa Ayers MD - Primary    Assisting Surgeon: None    Pre-op Diagnosis:  Right lateral epicondylitis [M77.11]    Post-op Diagnosis:  Post-Op Diagnosis Codes:     * Right lateral epicondylitis [M77.11]    Procedure(s) (LRB):  EXCISION, NEUROMA (Right)  INSERTION-IMPLANT (Right)    Anesthesia: Monitor Anesthesia Care    Operative Findings: right elbow neuroma excision    Estimated Blood Loss: minimal          Specimens:   Specimen (24h ago, onward)      None              Discharge Note    OUTCOME: Patient tolerated treatment/procedure well without complication and is now ready for discharge.    DISPOSITION: Home or Self Care    FINAL DIAGNOSIS:  right lateral epicondylitis elbow   FOLLOWUP: In clinic    DISCHARGE INSTRUCTIONS:    Discharge Procedure Orders   Diet general     Call MD for:  temperature >100.4     Call MD for:  persistent nausea and vomiting     Call MD for:  severe uncontrolled pain     Call MD for:  difficulty breathing, headache or visual disturbances     Call MD for:  redness, tenderness, or signs of infection (pain, swelling, redness, odor or green/yellow discharge around incision site)     Call MD for:  hives     Call MD for:  persistent dizziness or light-headedness     Ice to affected area   Order Comments: using barrier between ice and skin (specify duration&frequency)     No driving, operating heavy equipment or signing legal documents while taking pain medication     Remove dressing in 72 hours     Shower on day dressing removed (No bath)

## 2024-07-02 NOTE — PLAN OF CARE
VSS.  Patient tolerating oral liquids without difficulty.   No apparent s&s of distress noted at this time, no complaints voiced at this time.   Discharge instructions reviewed with patient and spouse with good verbal feedback received.   Post op medications delivered to bedside, DME not needed.  Patient ready for discharge.

## 2024-07-02 NOTE — TRANSFER OF CARE
"Anesthesia Transfer of Care Note    Patient: Jaime Mcpherson    Procedure(s) Performed: Procedure(s) (LRB):  EXCISION, NEUROMA (Right)  INSERTION-IMPLANT (Right)    Patient location: PACU    Anesthesia Type: MAC    Transport from OR: Transported from OR on 6-10 L/min O2 by face mask with adequate spontaneous ventilation    Post pain: adequate analgesia    Post assessment: no apparent anesthetic complications and tolerated procedure well    Post vital signs: stable    Level of consciousness: sedated    Nausea/Vomiting: no nausea/vomiting    Complications: none    Transfer of care protocol was followed      Last vitals: Visit Vitals  /67 (BP Location: Left arm, Patient Position: Lying)   Pulse (!) 53   Temp 36.6 °C (97.8 °F) (Oral)   Resp 14   Ht 6' 1" (1.854 m)   Wt 69.4 kg (153 lb)   SpO2 100%   BMI 20.19 kg/m²     "

## 2024-07-02 NOTE — OP NOTE
DATE OF PROCEDURE: 07/02/2024    SURGEON: Sosa Ayers M.D.     ASSISTANT: SMA Jhoan      PREOPERATIVE DIAGNOSES:   right  1. elbow lateral epicondylitis (ICD-9 726.32)  2. 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   3. 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 elbow lateral epicondylitis (ICD-9 726.32)  2. 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   3. 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 elbow excision of neuroma, posterior branch, posterior cutaneous nerve   of the forearm (CPT 79614).   2. elbow implantation of nerve into bone or muscle (posterior branch and   posterior cutaneous nerve of the forearm into triceps (CPT 44610).     ANESTHESIA: Local MAC with 0.5% bupivacaine.     BLOOD LOSS: Minimal.     DRAINS: None.     TOURNIQUET TIME: 11 min.     COMPLICATIONS: None.     DISPOSITION: The patient was extubated and moved to Recovery Room in stable   condition with compartment soft, cap refill less than a second all digits.     BRIEF INDICATION OF MEDICAL NECESSITY: The patient is a 48 y.o. year-old male who has history and physical examination findings consistent with the above. Nonoperative versus operative options were discussed. The risks and benefits were discussed with the patient. The patient acknowledged and wished to proceed with operative intervention. Informed consent was obtained prior to the procedure. Details of surgical procedure were explained, including expectations and probable rehabilitation course. The patient understands the likely length of convalescence after surgery. The patient was explained the risks, benefits and alternatives of surgery. Reasonable expectations and potential complications were discussed and acknowledged,  including but not limited to numbness, infection, bleeding, blood clots (DVT and/or PE), nerve injury, tear/retear, instability, continued pain, loss of function, recurrence and stiffness. It was also explained that there was a chance of failure which may require further management. The patient agreed, understood and wished to proceed.     PROCEDURE IN DETAIL: After the correct operative site was marked by the operating surgeon, the patient was taken to the Operating Room and placed supine on the operating room table where the patient underwent MAC anesthesia by the Anesthesia team. Preoperative antibiotics were administered. The patient was positioned and the tourniquet was placed high on the operative upper extremity. Lower extremities and non-operative upper extremity were checked and placed in well-padded comfortable positions.  The operative upper extremity  was prepped and draped in the usual sterile fashion. After prepping and draping, the appropriate landmarks were noted on the skin.     A 4 cm longitudinal incision was made with the distal end at the lateral epicondyle. Dissection using operative loupes was performed during this procedure. The posterior cutaneous nerve of the forearm (PCNF) was identified superficial to the fascia. The branch posteriorly consisting of the posterior branch of the posterior cutaneous nerve of the forearm (PBPCNF) was identified. Superficial dissection was carried into the subcutaneous fat to identify the PBPCNF and distinguish it from the PCNF. The PBPCNF consisted of 1 main branch and 2 smaller sub-branches.     Care was taken to ensure that the branches to the lateral epicondyle were identified and not the more longitudinally oriented PCNF, which was preserved. Gentle traction on the PCNF resulted in skin movement distally and this nerve was preserved. Gentle traction on the PBPCNF resulted in subtle skin movement over the lateral epicondyle, identifying the appropriate  nerve for excision. The PBPCNF headed posteriorly towards the lateral epicondyle in direction. The PBPCNF was mobilized as far distally and proximally as possible. PBPCNF was then injected with 0.5% bupivacaine and transected distally and buried within the lateral head of the triceps muscle posteriorly.     The incisions were copiously and gently irrigated. The incisions were closed using a 3-0 Vicryl suture and 4-0 Monocryl suture. Mastisol and Steri-Strips were placed with Xeroform, 4 x 4's and ABD pads and Ace wrap. TENS unit pads placed, which were medically necessary for pain relief. The sling was then placed.     The patient was then moved supine, extubated and taken to Recovery Room where the patient arrived in stable condition with compartment soft and cap refill less than a second to all digits.     POSTOPERATIVE PLAN: We will follow the posterior branch of the posterior cutaneous nerve of the forearm excision guidelines. The sling will be weaned over the next 2 to 7 days, with immediate range of motion as tolerated. The patient will be allowed to immediately resume activities of daily living and allowed to return to full activity and sport by 4 weeks after surgery.  We discussed this with the patient's family after surgery.

## 2024-07-02 NOTE — ANESTHESIA PREPROCEDURE EVALUATION
07/02/2024  [facility]  Anesthesia Pre-Operative Evaluation     Patient Name: Jaime Mcpherson  YOB: 1975  MRN: [id number]  [facility]: [id number]       Admit Date: 7/2/2024   Admit Team: Networked reference to record PCT   Hospital Day: 1  Date of Procedure: 7/2/2024  Anesthesia: Monitor Anesthesia Care Procedure: Procedure(s) (LRB):  EXCISION, NEUROMA (Right)  INSERTION-IMPLANT (Right)  Pre-Operative Diagnosis: Right lateral epicondylitis [M77.11]  Proceduralist:Surgeons and Role:     * Sosa Ayers MD - Primary  Code Status: Full Code   Advanced Directive: <no information>  Isolation Precautions: No active isolations  Capacity: Full capacity     SUBJECTIVE:   Jaime Mcpherson is a 48 y.o. male who  has a past medical history of Bee sting allergy and GERD (gastroesophageal reflux disease).  No notes on file     0.9% NaCl   Intravenous Continuous         Hospital LOS: 0 days  ICU LOS: Patient does not have an ICU stay during this admission.    he has a current medication list which includes the following long-term medication(s): trazodone, aspirin, and epinephrine.     ALLERGIES:     Review of patient's allergies indicates:   Allergen Reactions    Venom-wasp Anaphylaxis     LDA:   AIRWAY:         [unfilled]     Lines/Drains/Airways       None                  Anesthesia Evaluation      Airway   Mallampati: II  TM distance: Normal  Neck ROM: Normal ROM  Dental      Pulmonary    Cardiovascular     Neuro/Psych      GI/Hepatic/Renal    (+) GERD    Endo/Other    Abdominal                    MEDICATIONS:     Current Outpatient Medications on File Prior to Encounter   Medication Sig Dispense Refill Last Dose    ibuprofen (ADVIL,MOTRIN) 200 MG tablet Take 400 mg by mouth 2 (two) times daily as needed for Pain.   Past Week      Inpatient Medications:  Antibiotics (From admission,  "onward)      Start     Stop Route Frequency Ordered    07/02/24 1046  ceFAZolin 2 g in D5W 50 mL IVPB (MB+)         -- IV On Call Procedure 07/02/24 1046          VTE Risk Mitigation (From admission, onward)           Ordered     Place DELGADO hose  Until discontinued         07/02/24 1046     Place sequential compression device  Until discontinued         07/02/24 1046     IP VTE LOW RISK PATIENT  Once         07/02/24 1046                   pregabalin  75 mg Oral Once Pre-Op       Current Facility-Administered Medications   Medication Dose Route Frequency Provider Last Rate Last Admin    0.9%  NaCl infusion   Intravenous Continuous Efraín Dick III, PA-C        ceFAZolin 2 g in D5W 50 mL IVPB (MB+)  2 g Intravenous On Call Procedure Efraín Dick III, PA-C        pregabalin capsule 75 mg  75 mg Oral Once Pre-Op Efraín Dick III, PA-C              History:   There are no hospital problems to display for this patient.    Surgical History:    has a past surgical history that includes Mouth surgery and [place] tooth extraction.   Social History:    reports being sexually active and has had partner(s) who are female. He reports using the following method of birth control/protection: I.U.D..  reports that he has been smoking cigars. He has never used smokeless tobacco. He reports current alcohol use of about 2.0 standard drinks of alcohol per week. He reports that he does not use drugs.    Vitals:    07/02/24 1103   BP: 127/67   BP Location: Left arm   Patient Position: Lying   Pulse: 66   Resp: 18   Temp: 36.6 °C (97.8 °F)   TempSrc: Oral   SpO2: 98%   Weight: 69.4 kg (153 lb)   Height: 6' 1" (1.854 m)     Vital Signs Range (Last 24H):  Temp:  [36.6 °C (97.8 °F)]   Pulse:  [66]   Resp:  [18]   BP: (127)/(67)   SpO2:  [98 %]     Body mass index is 20.19 kg/m².  Wt Readings from Last 4 Encounters:   07/02/24 69.4 kg (153 lb)   06/20/24 69.6 kg (153 lb 6.4 oz)   06/10/24 69 kg (152 lb 1.9 oz)   05/02/24 69 kg " "(152 lb 3.2 oz)        Intake/Output - Last 3 Shifts       None          Lab Results   Component Value Date    WBC 10.00 06/20/2024    HGB 15.2 06/20/2024    HCT 45.2 06/20/2024     06/20/2024     06/20/2024    K 4.0 06/20/2024     06/20/2024    CREATININE 1.0 06/20/2024    BUN 17 06/20/2024    CO2 26 06/20/2024    GLU 88 06/20/2024    CALCIUM 9.7 06/20/2024    ALKPHOS 83 06/20/2024    ALT 10 06/20/2024    AST 13 06/20/2024    ALBUMIN 4.0 06/20/2024    HGBA1C 5.4 05/04/2024     No results found for this or any previous visit (from the past 12 hour(s)).  No results for input(s): "WBC", "HGB", "HCT", "PLT", "NA", "K", "CREATININE", "GLU", "INR" in the last 168 hours.  No LMP for male patient.    EKG:   Results for orders placed or performed during the hospital encounter of 06/20/24   SCHEDULED EKG 12-LEAD (to Muse)    Collection Time: 06/20/24 12:35 PM   Result Value Ref Range    QRS Duration 84 ms    [facility] QTC Calculation 366 ms    Narrative    Test Reason : Z01.810,    Vent. Rate : 056 BPM     Atrial Rate : 056 BPM     P-R Int : 258 ms          QRS Dur : 084 ms      QT Int : 380 ms       P-R-T Axes : 078 072 070 degrees     QTc Int : 366 ms    Sinus bradycardia with 1st degree A-V block  Otherwise normal ECG  No previous ECGs available  Confirmed by Randall Draper MD ([id number]) on 6/20/2024 4:02:33 PM    Referred By: ARABELLA SANTANA           Confirmed By:Randall Draper MD     TTE:  No results found for this or any previous visit.  No results found for this or any previous visit.  RAINA:  No results found for this or any previous visit.  Stress Test:  No results found for this or any previous visit.     LHC:  No results found for this or any previous visit.     PFT:  No results found for: "FEV1", "FVC", "UIY5QMB", "TLC", "DLCO"           Pre-op Assessment    I have reviewed the Patient Summary Reports.     I have reviewed the Nursing Notes. I have reviewed the NPO Status.   I have reviewed the Medications.     Review " of Systems  Anesthesia Hx:               Denies Personal Hx of Anesthesia complications.                    Hepatic/GI:     GERD                 Physical Exam  General: Alert, Cooperative and Oriented    Airway:  Mallampati: II   Mouth Opening: Normal  TM Distance: Normal  Tongue: Normal  Neck ROM: Normal ROM        Anesthesia Plan  Type of Anesthesia, risks & benefits discussed:    Anesthesia Type: Gen ETT, Gen Natural Airway, MAC  Intra-op Monitoring Plan: Standard ASA Monitors  Post Op Pain Control Plan: multimodal analgesia  Induction:  IV  Airway Plan: Direct  Informed Consent: Informed consent signed with the Patient and all parties understand the risks and agree with anesthesia plan.  All questions answered.   ASA Score: 1  Day of Surgery Review of History & Physical: H&P Update referred to the surgeon/provider.    Ready For Surgery From Anesthesia Perspective.     .

## 2024-07-02 NOTE — PROGRESS NOTES
Pre op completed.  Patient belongings given to spouse. Bed in lowest position. Call light within reach. Family at beside. DME not needed. Bear hugger refused.

## 2024-07-04 NOTE — ANESTHESIA POSTPROCEDURE EVALUATION
Anesthesia Post Evaluation    Patient: Jaime Mcpherson    Procedure(s) Performed: Procedure(s) (LRB):  EXCISION, NEUROMA (Right)  INSERTION-IMPLANT (Right)    Final Anesthesia Type: general      Patient location during evaluation: PACU  Patient participation: Yes- Able to Participate  Level of consciousness: awake and alert  Post-procedure vital signs: reviewed and stable  Pain management: adequate  Airway patency: patent    PONV status at discharge: No PONV  Anesthetic complications: no      Cardiovascular status: hemodynamically stable  Hydration status: euvolemic  Follow-up not needed.              Vitals Value Taken Time   /75 07/02/24 1602   Temp 36.6 °C (97.9 °F) 07/02/24 1450   Pulse 56 07/02/24 1610   Resp 10 07/02/24 1610   SpO2 97 % 07/02/24 1610   Vitals shown include unfiled device data.      Event Time   Out of Recovery 15:30:00         Pain/Eduardo Score: No data recorded

## 2024-07-08 ENCOUNTER — PATIENT MESSAGE (OUTPATIENT)
Dept: FAMILY MEDICINE | Facility: CLINIC | Age: 49
End: 2024-07-08
Payer: COMMERCIAL

## 2024-07-08 DIAGNOSIS — R06.83 SNORING: Primary | ICD-10-CM

## 2024-07-17 ENCOUNTER — OFFICE VISIT (OUTPATIENT)
Dept: SPORTS MEDICINE | Facility: CLINIC | Age: 49
End: 2024-07-17
Payer: COMMERCIAL

## 2024-07-17 VITALS
WEIGHT: 154.56 LBS | HEART RATE: 72 BPM | SYSTOLIC BLOOD PRESSURE: 112 MMHG | DIASTOLIC BLOOD PRESSURE: 66 MMHG | BODY MASS INDEX: 20.49 KG/M2 | HEIGHT: 73 IN

## 2024-07-17 DIAGNOSIS — M77.01 MEDIAL EPICONDYLITIS OF ELBOW, RIGHT: ICD-10-CM

## 2024-07-17 DIAGNOSIS — M25.521 RIGHT ELBOW PAIN: ICD-10-CM

## 2024-07-17 DIAGNOSIS — Z09 S/P ORTHOPEDIC SURGERY, FOLLOW-UP EXAM: Primary | ICD-10-CM

## 2024-07-17 PROCEDURE — 99024 POSTOP FOLLOW-UP VISIT: CPT | Mod: S$GLB,,, | Performed by: PHYSICIAN ASSISTANT

## 2024-07-17 PROCEDURE — 3074F SYST BP LT 130 MM HG: CPT | Mod: CPTII,S$GLB,, | Performed by: PHYSICIAN ASSISTANT

## 2024-07-17 PROCEDURE — 3044F HG A1C LEVEL LT 7.0%: CPT | Mod: CPTII,S$GLB,, | Performed by: PHYSICIAN ASSISTANT

## 2024-07-17 PROCEDURE — 99999 PR PBB SHADOW E&M-EST. PATIENT-LVL III: CPT | Mod: PBBFAC,,, | Performed by: PHYSICIAN ASSISTANT

## 2024-07-17 PROCEDURE — 1160F RVW MEDS BY RX/DR IN RCRD: CPT | Mod: CPTII,S$GLB,, | Performed by: PHYSICIAN ASSISTANT

## 2024-07-17 PROCEDURE — 3078F DIAST BP <80 MM HG: CPT | Mod: CPTII,S$GLB,, | Performed by: PHYSICIAN ASSISTANT

## 2024-07-17 PROCEDURE — 1159F MED LIST DOCD IN RCRD: CPT | Mod: CPTII,S$GLB,, | Performed by: PHYSICIAN ASSISTANT

## 2024-07-18 NOTE — PROGRESS NOTES
Chief Complaint: right elbow pain    HISTORY OF PRESENT ILLNESS:   Pt is here today for post-operative followup of right elbow surgery.  he is doing well.  We have reviewed patient's findings and discussed plan of care and future treatment options.      He denies lateral elbow pain. Still having unchanged medial elbow pain.   Pain at 0/10 on lateral elbow and 3/10 on medial elbow.      No issue reported.   No longer taking pain medications.     He denies numbness, tingling, or paresthesias of his hand or fingers.     DATE OF PROCEDURE: 07/02/2024  SURGEON: Sosa Ayers M.D.   OPERATIONS:   right  1. elbow excision of neuroma, posterior branch, posterior cutaneous nerve   of the forearm (CPT 40480).   2. elbow implantation of nerve into bone or muscle (posterior branch and   posterior cutaneous nerve of the forearm into triceps (CPT 34404).                                                                               PHYSICAL EXAMINATION:     Incision sites healed well  No evidence of any erythema, infection or induration  elbow Range of motion full   Minimal effusion  2+ Radial pulses  No swelling  + medial elbow TTP which is unchanged from prior to surgery.                                                                                ASSESSMENT:                                                                                                                                               1. Status post above, doing well.                                                                                                                               PLAN:                                                                                                                                                     1. He would like to proceed with second stage of surgery for elbow. Discussed dates and he will let me know when he wants to proceed.   2. Emphasized continued ROM. Keep incision covered and don't get wet until Monday of next  week.   3. I have discussed return to activity in detail.  4. Patient will see us back in 4 weeks.                                          All questions were answered, surgical technique was reviewed and interpreted, and patient should contact us with any questions or concerns in the interim.

## 2024-07-21 ENCOUNTER — PATIENT MESSAGE (OUTPATIENT)
Dept: SPORTS MEDICINE | Facility: CLINIC | Age: 49
End: 2024-07-21
Payer: COMMERCIAL

## 2024-07-26 DIAGNOSIS — M77.01 MEDIAL EPICONDYLITIS OF ELBOW, RIGHT: Primary | ICD-10-CM

## 2024-08-05 ENCOUNTER — PATIENT MESSAGE (OUTPATIENT)
Dept: SPORTS MEDICINE | Facility: CLINIC | Age: 49
End: 2024-08-05

## 2024-08-05 ENCOUNTER — TELEPHONE (OUTPATIENT)
Dept: SPORTS MEDICINE | Facility: CLINIC | Age: 49
End: 2024-08-05
Payer: COMMERCIAL

## 2024-08-05 ENCOUNTER — OFFICE VISIT (OUTPATIENT)
Dept: SPORTS MEDICINE | Facility: CLINIC | Age: 49
End: 2024-08-05
Payer: COMMERCIAL

## 2024-08-05 VITALS
WEIGHT: 156.94 LBS | BODY MASS INDEX: 20.8 KG/M2 | HEIGHT: 73 IN | SYSTOLIC BLOOD PRESSURE: 123 MMHG | DIASTOLIC BLOOD PRESSURE: 81 MMHG

## 2024-08-05 DIAGNOSIS — M25.521 RIGHT ELBOW PAIN: ICD-10-CM

## 2024-08-05 DIAGNOSIS — M77.01 MEDIAL EPICONDYLITIS OF ELBOW, RIGHT: Primary | ICD-10-CM

## 2024-08-05 PROCEDURE — 99499 UNLISTED E&M SERVICE: CPT | Mod: S$GLB,,, | Performed by: PHYSICIAN ASSISTANT

## 2024-08-05 PROCEDURE — 99999 PR PBB SHADOW E&M-EST. PATIENT-LVL III: CPT | Mod: PBBFAC,,, | Performed by: PHYSICIAN ASSISTANT

## 2024-08-06 ENCOUNTER — PATIENT MESSAGE (OUTPATIENT)
Dept: PREADMISSION TESTING | Facility: HOSPITAL | Age: 49
End: 2024-08-06
Payer: COMMERCIAL

## 2024-08-06 NOTE — ANESTHESIA PAT ROS NOTE
08/06/2024  Jaime Mcpherson is a 48 y.o., male.      Pre-op Assessment    I have reviewed the Patient Summary Reports.       I have reviewed the Medications.     Review of Systems  Anesthesia Hx:  No problems with previous Anesthesia   History of prior surgery of interest to airway management or planning:  Previous anesthesia: MAC       7/2/2024  Removal of Neuroma right elbow, Insertion of Implant with MAC.  Procedure performed at an [facility].    Denies Personal Hx of Anesthesia complications.                    Social:  Smoker, Social Alcohol Use Smokes cigars some days      Hematology/Oncology:  Hematology Normal   Oncology Normal                                   EENT/Dental:   Dry eye syndrome, bilateral,  Refractive error            Cardiovascular:  Cardiovascular Normal Exercise tolerance: good       Denies CAD.       Denies Angina.       Denies BEJARANO.                            Pulmonary:     Denies Asthma.   Denies Shortness of breath.   Snores               Renal/:  Renal/ Normal                 Hepatic/GI:     GERD Denies Liver Disease.            Musculoskeletal:     Medial epicondylitis of elbow, right,  H/O Excision of Neuroma Right Elbow, Insertion of Implant            Neurological:  Neurology Normal   Denies CVA.    Denies Headaches. Denies Seizures.                                Endocrine:  Endocrine Normal Denies Diabetes. Denies Hypothyroidism.          Psych:     Primary insomnia              Past Medical History:   Diagnosis Date    Bee sting allergy     GERD (gastroesophageal reflux disease)      Past Surgical History:   Procedure Laterality Date    EXCISION, NEUROMA Right 7/2/2024    Procedure: EXCISION, NEUROMA;  Surgeon: Sosa Ayers MD;  Location: [facility];  Service: Orthopedics;  Laterality: Right;    INSERTION-IMPLANT Right 7/2/2024    Procedure: INSERTION-IMPLANT;   Surgeon: Sosa Ayers MD;  Location: [facility];  Service: Orthopedics;  Laterality: Right;    MOUTH SURGERY      WISDOM TOOTH EXTRACTION         Anesthesia Assessment: Preoperative EQUATION    Planned Procedure: Procedure(s) (LRB):  TENOTOMY,ELBOW,WITH DEBRIDEMENT AND TENDON REPAIR (Right)  Requested Anesthesia Type:General  Surgeon: Sosa Ayers MD  Service: Orthopedics  Known or anticipated Date of Surgery:8/13/2024    Surgeon notes: reviewed    Electronic QUestionnaire Assessment completed via nurse interview with patient.        Triage considerations:     The patient has no apparent active cardiac condition (No unstable coronary Syndrome such as severe unstable angina or recent [<1 month] myocardial infarction, decompensated CHF, severe valvular   disease or significant arrhythmia)    Previous anesthesia records:MAC and No problems    [facility] P[facility]   Subspecialty notes: n/a    Other important co-morbidities: GERD and Smoker       EKG 6/20/2024:  Vent. Rate : 056 BPM     Atrial Rate : 056 BPM      P-R Int : 258 ms          QRS Dur : 084 ms       QT Int : 380 ms       P-R-T Axes : 078 072 070 degrees      QTc Int : 366 ms   Sinus bradycardia with 1st degree A-V block   Otherwise normal ECG   No previous ECGs available   Confirmed by Randall Draper MD ([id number]) on 6/20/2024 4:02:33 PM        Tests already available:  Results have been reviewed.             Instructions given. (See in Nurse's note)  Preop medication instructions sent via portal message.     Optimization:  Anesthesia Preop Clinic Assessment Not Indicated    Medical Opinion Indicated: No       Sub-specialist consult indicated:  No      Plan:  Consultation:Patient's PCP for a statement of optimization    Patient  has previously scheduled Medical Appointment:    Navigation:  Straight Line to surgery.               No tests, anesthesia preop clinic visit, or consult required.                  Patient does not need a new clearance  for this procedure.       Ht: 6'1  Wt: 71.2 kg (156 lb)  BMI: 20.71

## 2024-08-07 RX ORDER — CEFAZOLIN SODIUM 2 G/50ML
2 SOLUTION INTRAVENOUS
OUTPATIENT
Start: 2024-08-07

## 2024-08-07 RX ORDER — OXYCODONE AND ACETAMINOPHEN 10; 325 MG/1; MG/1
TABLET ORAL
Qty: 21 TABLET | Refills: 0 | Status: SHIPPED | OUTPATIENT
Start: 2024-08-07

## 2024-08-07 RX ORDER — OXYCODONE HCL 10 MG/1
10 TABLET, FILM COATED, EXTENDED RELEASE ORAL
OUTPATIENT
Start: 2024-08-07 | End: 2024-08-07

## 2024-08-07 RX ORDER — SODIUM CHLORIDE 9 MG/ML
INJECTION, SOLUTION INTRAVENOUS CONTINUOUS
OUTPATIENT
Start: 2024-08-07

## 2024-08-07 RX ORDER — TRAMADOL HYDROCHLORIDE 50 MG/1
50-100 TABLET ORAL EVERY 6 HOURS PRN
Qty: 21 TABLET | Refills: 0 | Status: SHIPPED | OUTPATIENT
Start: 2024-08-07

## 2024-08-07 RX ORDER — PREGABALIN 75 MG/1
75 CAPSULE ORAL
OUTPATIENT
Start: 2024-08-07 | End: 2024-08-07

## 2024-08-07 RX ORDER — ASPIRIN 81 MG/1
81 TABLET ORAL DAILY
COMMUNITY
Start: 2024-08-07 | End: 2025-08-07

## 2024-08-07 NOTE — H&P (VIEW-ONLY)
Jaime Mcpherson  is here for a completion of his perioperative paperwork. he  Is scheduled to undergo     Right Elbow  Debridement, medial epicondyle  on 8/13/24.      He is a healthy individual and does not need new clearance for this procedure.    PAST MEDICAL HISTORY:   Past Medical History:   Diagnosis Date    Bee sting allergy     GERD (gastroesophageal reflux disease)      PAST SURGICAL HISTORY:   Past Surgical History:   Procedure Laterality Date    EXCISION, NEUROMA Right 7/2/2024    Procedure: EXCISION, NEUROMA;  Surgeon: Sosa Ayers MD;  Location: [facility] OR;  Service: Orthopedics;  Laterality: Right;    INSERTION-IMPLANT Right 7/2/2024    Procedure: INSERTION-IMPLANT;  Surgeon: Sosa Ayers MD;  Location: [facility] OR;  Service: Orthopedics;  Laterality: Right;    MOUTH SURGERY      WISDOM TOOTH EXTRACTION       FAMILY HISTORY:   Family History   Problem Relation Name Age of Onset    Cancer Mother adopted 55        lung/smoker    Hypothyroidism Mother adopted     Lupus Father      No Known Problems Brother      Stroke Paternal Grandmother      No Known Problems Paternal Grandfather      No Known Problems Daughter      No Known Problems Son      No Known Problems Paternal Uncle      Allergic rhinitis Neg Hx      Allergies Neg Hx      Angioedema Neg Hx      Asthma Neg Hx      Atopy Neg Hx      Eczema Neg Hx      Immunodeficiency Neg Hx      Rhinitis Neg Hx      Urticaria Neg Hx      Glaucoma Neg Hx      Macular degeneration Neg Hx       SOCIAL HISTORY:   Social History     Socioeconomic History    Marital status:    Tobacco Use    Smoking status: Some Days     Types: Cigars    Smokeless tobacco: Never    Tobacco comments:     electric / [place] [phone]   Substance and Sexual Activity    Alcohol use: Yes     Alcohol/week: 2.0 standard drinks of alcohol     Types: 2 Standard drinks or equivalent per week     Comment: occ    Drug use: No    Sexual activity: Yes     Partners: Female     Birth  control/protection: I.U.D.     Social Determinants of Health     Financial Resource Strain: Low Risk  (6/10/2024)    Overall Financial Resource Strain (CARDIA)     Difficulty of Paying Living Expenses: Not hard at all   Food Insecurity: No Food Insecurity (6/10/2024)    Hunger Vital Sign     Worried About Running Out of Food in the Last Year: Never true     Ran Out of Food in the Last Year: Never true   Physical Activity: Sufficiently Active (6/10/2024)    Exercise Vital Sign     Days of Exercise per Week: 5 days     Minutes of Exercise per Session: 60 min   Stress: Stress Concern Present (6/10/2024)    [employer] [place] [employer] - Occupational Stress Questionnaire     Feeling of Stress : To some extent   Housing Stability: Unknown (6/10/2024)    Housing Stability Vital Sign     Unable to Pay for Housing in the Last Year: No       MEDICATIONS:   Current Outpatient Medications:     EPINEPHrine (EPIPEN) 0.3 mg/0.3 mL AtIn, Inject 0.3 mLs (0.3 mg total) into the muscle as needed (Anaphylaxis)., Disp: 2 each, Rfl: 0    ibuprofen (ADVIL,MOTRIN) 200 MG tablet, Take 400 mg by mouth 2 (two) times daily as needed for Pain., Disp: , Rfl:     traZODone (DESYREL) 150 MG tablet, Take 1 tablet (150 mg total) by mouth every evening., Disp: 90 tablet, Rfl: 3    aspirin (ECOTRIN) 81 MG EC tablet, Take 1 tablet (81 mg total) by mouth once daily. For 4 weeks starting the day after surgery., Disp: , Rfl:     HYDROcodone-acetaminophen (NORCO)  mg per tablet, Take 1 tablet by mouth every 4-6 hours for pain. (Patient not taking: Reported on 7/17/2024), Disp: 8 tablet, Rfl: 0    meloxicam (MOBIC) 15 MG tablet, Take 1 tablet (15 mg total) by mouth once daily. (Patient not taking: Reported on 8/5/2024), Disp: 30 tablet, Rfl: 1    oxyCODONE-acetaminophen (PERCOCET)  mg per tablet, Take 1 tablet by mouth every 4-6 hours as needed for pain. Take stool softener with this medication., Disp: 21 tablet, Rfl: 0     "promethazine (PHENERGAN) 25 MG tablet, Take 1 tablet (25 mg total) by mouth every 6 (six) hours as needed for Nausea. (Patient not taking: Reported on 6/20/2024), Disp: 8 tablet, Rfl: 0    traMADoL (ULTRAM) 50 mg tablet, Take 1-2 tablets ( mg total) by mouth every 6 (six) hours as needed for Pain., Disp: 21 tablet, Rfl: 0  ALLERGIES:   Review of patient's allergies indicates:   Allergen Reactions    Venom-wasp Anaphylaxis       VITAL SIGNS: /81   Ht 6' 1" (1.854 m)   Wt 71.2 kg (156 lb 15.5 oz)   BMI 20.71 kg/m²      Risks, indications and benefits of the surgical procedure were discussed with the patient. All questions with regard to surgery, rehab, expected return to functional activities, activities of daily living and recreational endeavors were answered to his satisfaction.    It was explained to the patient that there may be an increase in surgical risks if the patient has certain co-morbidities such as but not limited to: Obesity, Cardiovascular issues (CHF, CAD, Arrhythmias), chronic pulmonary issues, previous or current neurovascular/neurological issues, previous strokes, diabetes mellitus, previous wound healing issues, previous wound or skin infections, PVD, clotting disorders, if the patient uses chronic steroids, if the patient takes or has immune compromising medications or diseases, or has previously or currently used tobacco products.     The patient verbalized that he/she does not have any additional clotting, bleeding, or blood disorders, other than what is list in her chart on today's review.     Then a brief history and physical exam were performed.    Review of Systems   Constitution: Negative. Negative for chills, fever and night sweats.   HENT: Negative for congestion and headaches.    Eyes: Negative for blurred vision, left vision loss and right vision loss.   Cardiovascular: Negative for chest pain and syncope.   Respiratory: Negative for cough and shortness of breath.  "   Endocrine: Negative for polydipsia, polyphagia and polyuria.   Hematologic/Lymphatic: Negative for bleeding problem. Does not bruise/bleed easily.   Skin: Negative for dry skin, itching and rash.   Musculoskeletal: Negative for falls and muscle weakness.   Gastrointestinal: Negative for abdominal pain and bowel incontinence.   Genitourinary: Negative for bladder incontinence and nocturia.   Neurological: Negative for disturbances in coordination, loss of balance and seizures.   Psychiatric/Behavioral: Negative for depression. The patient does not have insomnia.    Allergic/Immunologic: Negative for hives and persistent infections.     PHYSICAL EXAM:  GEN: A&Ox3, WD WN NAD  HEENT: WNL  CHEST: CTAB, no W/R/R  HEART: RRR, no M/R/G  ABD: Soft, NT ND, BS x4 QUADS  MS; See Epic  NEURO: CN II-XII intact       The surgical consent was then reviewed with the patient, who agreed with all the contents of the consent form and it was signed. he was then given the surgery packet to bring with him to surgery center for the anesthesia portion of his perioperative paperwork (if needed)   For all physicians except for Dr. Whitehead, we will email and possibly fax the consent forms and booking sheets to [facility].    The patient was given the opportunity to ask questions about the surgical plan and consent form, and once no other questions were asked, I proceeded with the pre-op appointment.    PHYSICAL THERAPY:  He was also instructed regarding physical therapy and will begin on  POD10. He was given a copy of the original prescription to schedule. Another copy of this prescription was also faxed to [facility] in [place].    POST OP CARE:instructions were reviewed including care of the wound and dressing after surgery and when he can shower. Patient was told not sleep or lay on there surgical extremity following surgery as this could cause repair damage, tissue damage, or nerve injury.    An extensive amount of time  was spent on discussion of the following information based on what type of surgery the patient was having. Patient expressed understanding of the material below:    Shoulder surgery or upper extremity surgery requiring post-op sling:  It was explained to the patient that they should remove their arm from the sling approximately 6 times per day to do full elbow ROM (flexion and extension) and full supination and pronation of the elbow for approximately 5 minutes at a time to help prevent elbow stiffness, nerve pain or problems, or nerve injury. They were told to contact us if they begin having numbness and tingling of there surgical extremity that persists longer then 1 day without relief.     Extremity surgery requiring a splint:   It was explained to patient on how to properly elevated position there extremity to prevent pressure ulcers from occurring. I made sure that the patient understood that that surgical site may be numb following surgery and prevent them from feeling pressure pain that they would normal feel if a pressure injury was occurring. Pressure ulcers and there causes were discussed with the patient today.     Post-operative splint:  It was explain to the patient that they can contact us at anytime if they feel that there is a problem with their splint or under their splint that needs evaluation. If there is concern, questions, or discomfort with the splint then they can present to either our clinic or the [facility] ED for removal, evaluation, and replacement of the splint.    CRUTCHES OR WALKER: It was explained to the patient that if they are having a lower extremity surgery that they will require either a walker or crutches to ambulate safely with after surgery. It was explained that a cane or other assistive devices are not sufficient to safely ambulate with after surgery. I explained to the patient that I will place an order for them to receive either crutches or a walker after surgery  to go home with. It was explained that if they have crutches or a walker at home already, that they are REQUIRED to bring them to the hospital on the day of surgery. It was explained that if they do not have them at the hospital on the day of surgery that they WILL be provided a new pair or crutches or a walker to go home with to ensure ambulation will be safe if the patient needs to stop somewhere on the way home.      If the patient's mobility limitation cannot be sufficiently resolved by the use of crutches then it is necessary for the patient's functional mobility deficit to be sufficiently resolved with the use of a (Rolling Walker or Walker). Patient's mobility limitation significantly impairs their ability to participate in one of more activities of daily living. The use of a (Rolling Walker or Walker) will significantly improve the patient's ability to participate in MRADLS and the patient will use it on regular basis in the home.      PAIN MANAGEMENT: Jaime Mcpherson was also given a pain management regime, which includes the option of getting a TENS unit given to him by [facility] (if patient chooses) along with the education required for its use. He was also instructed regarding the Polar ice unit or gel ice packs (chosen by patient) that will be in place after surgery and his postoperative pain medications.     Patient understands that Polar Ice machine has to be bought today if they want it. It cannot be bought on day of surgery at surgery center.     PAIN MEDICATION:  Percocet 10/325mg 1 po q 4-6 hours prn pain  Ultram 50 mg Take 1-2 p.o. q.6 hours p.r.n. breakthrough pain,   He has nausea meds at home.     DVT prophylaxis was discussed with the patient today including risk factors for developing DVTs and history of DVTs. The patient was asked if any specific recommendations were given from the doctor/s that did pre-operative surgical clearance. The patient was then given an education sheet about  DVTs and PE with warning signs and symptoms of both and steps to take if they suspect either of these.    This along with the Modified Caprini risk assessment model for VTE in general surgical patients was used to determine the patient's DVT risk.     From: rBynn TIMMONS, Ancelmo DA, Randi SM, et al. Prevention of VTE in nonorthopedic surgical patients: antithrombotic therapy and prevention of thrombosis, 9th ed: American College of Chest Physicians evidence-based clinical practical guidelines. Chest 2012; 141:e227S. Copyright © 2012. Reproduced with permission from the American College of Chest Physicians.    The below listed DVT prophylaxis regimen along with bilateral DELGADO compression stockings will be used post-op. Length of treatment has been determined to be 10-42 days post-op by the above noted Caprini assessment model.     The patient was instructed to buy and take:  Aspirin 81mg QD x 4 weeks for DVT prophylaxis starting on the morning after surgery.  Patient will also use bilateral TEDs on lower extremities, SCDs during surgery, and early ambulation post-op. If the patient was previously taking 81mg baby aspirin, they were told to not take it starting 5 days prior to surgery and to restart the 81mg aspirin after surgery.       Patient was also told to buy over the counter Prilosec medication if needed and take it once daily for GI protection as long as they are taking NSAIDs or Aspirin.   Patient denies history of seizures.      The patient was told that narcotic pain medications may make them drowsy and instructions were given to not sign legal documents, drive or operate heavy machinery, cars, or equipment while under the influence of narcotic medications. The patient was told and understands that narcotic pain medications should only be used as needed to control pain and that other options of pain control include TENs unit and ice packs/unit.     As there were no other questions to be asked, he was given my  business card along with Sosa Ayers MD business card if he has any questions or concerns prior to surgery or in the postop period.

## 2024-08-12 ENCOUNTER — ANESTHESIA EVENT (OUTPATIENT)
Dept: SURGERY | Facility: HOSPITAL | Age: 49
End: 2024-08-12
Payer: COMMERCIAL

## 2024-08-12 ENCOUNTER — TELEPHONE (OUTPATIENT)
Dept: SPORTS MEDICINE | Facility: CLINIC | Age: 49
End: 2024-08-12
Payer: COMMERCIAL

## 2024-08-12 NOTE — TELEPHONE ENCOUNTER
Attempted to contact pt. Left voicemail.  Asked pt to return call to clinic at [phone] to discuss surgery arrival time. Quirkyt message also sent.

## 2024-08-12 NOTE — ANESTHESIA PREPROCEDURE EVALUATION
08/12/2024  Jaime Mcpherson is a 48 y.o., male.    Patient Active Problem List   Diagnosis    Right lateral epicondylitis     Past Surgical History:   Procedure Laterality Date    EXCISION, NEUROMA Right 7/2/2024    Procedure: EXCISION, NEUROMA;  Surgeon: Sosa Ayers MD;  Location: [facility] OR;  Service: Orthopedics;  Laterality: Right;    INSERTION-IMPLANT Right 7/2/2024    Procedure: INSERTION-IMPLANT;  Surgeon: Sosa Ayers MD;  Location: [facility] OR;  Service: Orthopedics;  Laterality: Right;    MOUTH SURGERY      WISDOM TOOTH EXTRACTION           Pre-op Assessment    I have reviewed the Patient Summary Reports.     I have reviewed the Nursing Notes. I have reviewed the NPO Status.   I have reviewed the Medications.     Review of Systems  Anesthesia Hx:  No problems with previous Anesthesia   History of prior surgery of interest to airway management or planning:  Previous anesthesia: MAC       7/2/2024  Removal of Neuroma right elbow, Insertion of Implant with MAC.  Procedure performed at an [facility].    Denies Personal Hx of Anesthesia complications.                    Social:  Smoker, Social Alcohol Use Smokes cigars some days      Hematology/Oncology:  Hematology Normal   Oncology Normal                                   EENT/Dental:   Dry eye syndrome, bilateral,  Refractive error            Cardiovascular:  Cardiovascular Normal Exercise tolerance: good       Denies CAD.       Denies Angina.       Denies BEJARANO.                            Pulmonary:     Denies Asthma.   Denies Shortness of breath.   Snores               Renal/:  Renal/ Normal                 Hepatic/GI:     GERD Denies Liver Disease.            Musculoskeletal:     Medial epicondylitis of elbow, right,  H/O Excision of Neuroma Right Elbow, Insertion of Implant            Neurological:  Neurology Normal   Denies CVA.    Denies  Headaches. Denies Seizures.                                Endocrine:  Endocrine Normal Denies Diabetes. Denies Hypothyroidism.          Psych:     Primary insomnia             Physical Exam  General: Well nourished    Airway:  Mallampati: II   Mouth Opening: Normal  TM Distance: Normal  Tongue: Normal  Neck ROM: Normal ROM      Past Medical History:   Diagnosis Date    Bee sting allergy     GERD (gastroesophageal reflux disease)      Past Surgical History:   Procedure Laterality Date    EXCISION, NEUROMA Right 7/2/2024    Procedure: EXCISION, NEUROMA;  Surgeon: Sosa Ayers MD;  Location: [facility] OR;  Service: Orthopedics;  Laterality: Right;    INSERTION-IMPLANT Right 7/2/2024    Procedure: INSERTION-IMPLANT;  Surgeon: Sosa Ayers MD;  Location: [facility] OR;  Service: Orthopedics;  Laterality: Right;    MOUTH SURGERY      WISDOM TOOTH EXTRACTION         Anesthesia Assessment: Preoperative EQUATION    Planned Procedure: Procedure(s) (LRB):  TENOTOMY,ELBOW,WITH DEBRIDEMENT AND TENDON REPAIR (Right)  Requested Anesthesia Type:General  Surgeon: Sosa Ayers MD  Service: Orthopedics  Known or anticipated Date of Surgery:8/13/2024    Surgeon notes: reviewed    Electronic QUestionnaire Assessment completed via nurse interview with patient.        Triage considerations:     The patient has no apparent active cardiac condition (No unstable coronary Syndrome such as severe unstable angina or recent [<1 month] myocardial infarction, decompensated CHF, severe valvular   disease or significant arrhythmia)    Previous anesthesia records:MAC and No problems    [facility]   Subspecialty notes: n/a    Other important co-morbidities: GERD and Smoker       EKG 6/20/2024:  Vent. Rate : 056 BPM     Atrial Rate : 056 BPM      P-R Int : 258 ms          QRS Dur : 084 ms       QT Int : 380 ms       P-R-T Axes : 078 072 070 degrees      QTc Int : 366 ms   Sinus bradycardia with 1st degree A-V block    Otherwise normal ECG   No previous ECGs available   Confirmed by Randall Draper MD ([id number]) on 6/20/2024 4:02:33 PM        Tests already available:  Results have been reviewed.             Instructions given. (See in Nurse's note)  Preop medication instructions sent via portal message.     Optimization:  Anesthesia Preop Clinic Assessment Not Indicated    Medical Opinion Indicated: No       Sub-specialist consult indicated:  No      Plan:  Consultation:Patient's PCP for a statement of optimization    Patient  has previously scheduled Medical Appointment:    Navigation:  Straight Line to surgery.               No tests, anesthesia preop clinic visit, or consult required.                  Patient does not need a new clearance for this procedure.       Ht: 6'1  Wt: 71.2 kg (156 lb)  BMI: 20.71    Anesthesia Plan  Type of Anesthesia, risks & benefits discussed:    Anesthesia Type: Gen ETT  Intra-op Monitoring Plan: Standard ASA Monitors  Post Op Pain Control Plan: multimodal analgesia  Induction:  IV  Airway Plan: Direct and Video  Informed Consent: Informed consent signed with the Patient and all parties understand the risks and agree with anesthesia plan.  All questions answered.   ASA Score: 1  Day of Surgery Review of History & Physical: H&P Update referred to the surgeon/provider.    Ready For Surgery From Anesthesia Perspective.     .

## 2024-08-13 ENCOUNTER — ANESTHESIA (OUTPATIENT)
Dept: SURGERY | Facility: HOSPITAL | Age: 49
End: 2024-08-13
Payer: COMMERCIAL

## 2024-08-13 ENCOUNTER — HOSPITAL ENCOUNTER (OUTPATIENT)
Facility: HOSPITAL | Age: 49
Discharge: HOME | End: 2024-08-13
Attending: ORTHOPAEDIC SURGERY | Admitting: ORTHOPAEDIC SURGERY
Payer: COMMERCIAL

## 2024-08-13 VITALS
OXYGEN SATURATION: 97 % | WEIGHT: 156 LBS | TEMPERATURE: 98 F | DIASTOLIC BLOOD PRESSURE: 76 MMHG | HEART RATE: 57 BPM | RESPIRATION RATE: 18 BRPM | HEIGHT: 73 IN | BODY MASS INDEX: 20.67 KG/M2 | SYSTOLIC BLOOD PRESSURE: 123 MMHG

## 2024-08-13 DIAGNOSIS — M25.521 RIGHT ELBOW PAIN: ICD-10-CM

## 2024-08-13 DIAGNOSIS — M77.01 MEDIAL EPICONDYLITIS OF ELBOW, RIGHT: Primary | ICD-10-CM

## 2024-08-13 PROCEDURE — 25000003 PHARM REV CODE 250: Performed by: PHYSICIAN ASSISTANT

## 2024-08-13 PROCEDURE — 63600175 PHARM REV CODE 636 W HCPCS: Performed by: PHYSICIAN ASSISTANT

## 2024-08-13 PROCEDURE — 63600175 PHARM REV CODE 636 W HCPCS: Performed by: NURSE ANESTHETIST, CERTIFIED REGISTERED

## 2024-08-13 PROCEDURE — 36000709 HC OR TIME LEV III EA ADD 15 MIN: Performed by: ORTHOPAEDIC SURGERY

## 2024-08-13 PROCEDURE — C1769 GUIDE WIRE: HCPCS | Performed by: ORTHOPAEDIC SURGERY

## 2024-08-13 PROCEDURE — 24359 REPAIR ELBOW DEB/ATTCH OPEN: CPT | Mod: 82,RT,, | Performed by: STUDENT IN AN ORGANIZED HEALTH CARE EDUCATION/TRAINING PROGRAM

## 2024-08-13 PROCEDURE — 25000003 PHARM REV CODE 250: Performed by: NURSE ANESTHETIST, CERTIFIED REGISTERED

## 2024-08-13 PROCEDURE — 36000708 HC OR TIME LEV III 1ST 15 MIN: Performed by: ORTHOPAEDIC SURGERY

## 2024-08-13 PROCEDURE — 25000003 PHARM REV CODE 250: Performed by: ORTHOPAEDIC SURGERY

## 2024-08-13 PROCEDURE — 99900035 HC TECH TIME PER 15 MIN (STAT)

## 2024-08-13 PROCEDURE — 37000009 HC ANESTHESIA EA ADD 15 MINS: Performed by: ORTHOPAEDIC SURGERY

## 2024-08-13 PROCEDURE — 63600175 PHARM REV CODE 636 W HCPCS: Performed by: ANESTHESIOLOGY

## 2024-08-13 PROCEDURE — 94761 N-INVAS EAR/PLS OXIMETRY MLT: CPT

## 2024-08-13 PROCEDURE — 71000033 HC RECOVERY, INTIAL HOUR: Performed by: ORTHOPAEDIC SURGERY

## 2024-08-13 PROCEDURE — 37000008 HC ANESTHESIA 1ST 15 MINUTES: Performed by: ORTHOPAEDIC SURGERY

## 2024-08-13 PROCEDURE — 25000003 PHARM REV CODE 250: Performed by: ANESTHESIOLOGY

## 2024-08-13 PROCEDURE — 71000015 HC POSTOP RECOV 1ST HR: Performed by: ORTHOPAEDIC SURGERY

## 2024-08-13 PROCEDURE — 24359 REPAIR ELBOW DEB/ATTCH OPEN: CPT | Mod: 79,RT,, | Performed by: ORTHOPAEDIC SURGERY

## 2024-08-13 PROCEDURE — 71000039 HC RECOVERY, EACH ADD'L HOUR: Performed by: ORTHOPAEDIC SURGERY

## 2024-08-13 PROCEDURE — 63600175 PHARM REV CODE 636 W HCPCS: Performed by: ORTHOPAEDIC SURGERY

## 2024-08-13 RX ORDER — PREGABALIN 75 MG/1
75 CAPSULE ORAL ONCE
Status: CANCELLED | OUTPATIENT
Start: 2024-08-13 | End: 2024-08-13

## 2024-08-13 RX ORDER — ROPIVACAINE HYDROCHLORIDE 5 MG/ML
INJECTION, SOLUTION EPIDURAL; INFILTRATION; PERINEURAL
Status: DISCONTINUED | OUTPATIENT
Start: 2024-08-13 | End: 2024-08-13 | Stop reason: HOSPADM

## 2024-08-13 RX ORDER — HALOPERIDOL 5 MG/ML
0.5 INJECTION INTRAMUSCULAR EVERY 10 MIN PRN
Status: DISCONTINUED | OUTPATIENT
Start: 2024-08-13 | End: 2024-08-13 | Stop reason: HOSPADM

## 2024-08-13 RX ORDER — GLUCAGON 1 MG
1 KIT INJECTION
Status: DISCONTINUED | OUTPATIENT
Start: 2024-08-13 | End: 2024-08-13 | Stop reason: HOSPADM

## 2024-08-13 RX ORDER — MORPHINE SULFATE 2 MG/ML
2 INJECTION, SOLUTION INTRAMUSCULAR; INTRAVENOUS EVERY 10 MIN PRN
Status: DISCONTINUED | OUTPATIENT
Start: 2024-08-13 | End: 2024-08-13 | Stop reason: HOSPADM

## 2024-08-13 RX ORDER — SODIUM CHLORIDE 0.9 % (FLUSH) 0.9 %
10 SYRINGE (ML) INJECTION
Status: DISCONTINUED | OUTPATIENT
Start: 2024-08-13 | End: 2024-08-13 | Stop reason: HOSPADM

## 2024-08-13 RX ORDER — OXYCODONE HYDROCHLORIDE 5 MG/1
5 TABLET ORAL
Status: DISCONTINUED | OUTPATIENT
Start: 2024-08-13 | End: 2024-08-13 | Stop reason: HOSPADM

## 2024-08-13 RX ORDER — DEXAMETHASONE SODIUM PHOSPHATE 4 MG/ML
INJECTION, SOLUTION INTRA-ARTICULAR; INTRALESIONAL; INTRAMUSCULAR; INTRAVENOUS; SOFT TISSUE
Status: DISCONTINUED | OUTPATIENT
Start: 2024-08-13 | End: 2024-08-13

## 2024-08-13 RX ORDER — KETAMINE HCL IN 0.9 % NACL 50 MG/5 ML
SYRINGE (ML) INTRAVENOUS
Status: DISCONTINUED | OUTPATIENT
Start: 2024-08-13 | End: 2024-08-13

## 2024-08-13 RX ORDER — METHOCARBAMOL 500 MG/1
1000 TABLET, FILM COATED ORAL ONCE
Status: COMPLETED | OUTPATIENT
Start: 2024-08-13 | End: 2024-08-13

## 2024-08-13 RX ORDER — ONDANSETRON HYDROCHLORIDE 2 MG/ML
4 INJECTION, SOLUTION INTRAVENOUS EVERY 12 HOURS PRN
Status: DISCONTINUED | OUTPATIENT
Start: 2024-08-13 | End: 2024-08-13 | Stop reason: HOSPADM

## 2024-08-13 RX ORDER — FENTANYL CITRATE 50 UG/ML
INJECTION, SOLUTION INTRAMUSCULAR; INTRAVENOUS
Status: DISCONTINUED | OUTPATIENT
Start: 2024-08-13 | End: 2024-08-13

## 2024-08-13 RX ORDER — SODIUM CHLORIDE 9 MG/ML
INJECTION, SOLUTION INTRAVENOUS CONTINUOUS
Status: DISCONTINUED | OUTPATIENT
Start: 2024-08-13 | End: 2024-08-13 | Stop reason: HOSPADM

## 2024-08-13 RX ORDER — OXYCODONE HYDROCHLORIDE 5 MG/1
10 TABLET ORAL EVERY 4 HOURS PRN
Status: DISCONTINUED | OUTPATIENT
Start: 2024-08-13 | End: 2024-08-13 | Stop reason: HOSPADM

## 2024-08-13 RX ORDER — OXYCODONE HCL 10 MG/1
10 TABLET, FILM COATED, EXTENDED RELEASE ORAL
Status: COMPLETED | OUTPATIENT
Start: 2024-08-13 | End: 2024-08-13

## 2024-08-13 RX ORDER — PROPOFOL 10 MG/ML
VIAL (ML) INTRAVENOUS
Status: DISCONTINUED | OUTPATIENT
Start: 2024-08-13 | End: 2024-08-13

## 2024-08-13 RX ORDER — ONDANSETRON HYDROCHLORIDE 2 MG/ML
INJECTION, SOLUTION INTRAVENOUS
Status: DISCONTINUED | OUTPATIENT
Start: 2024-08-13 | End: 2024-08-13

## 2024-08-13 RX ORDER — LIDOCAINE HYDROCHLORIDE 20 MG/ML
INJECTION INTRAVENOUS
Status: DISCONTINUED | OUTPATIENT
Start: 2024-08-13 | End: 2024-08-13

## 2024-08-13 RX ORDER — FAMOTIDINE 10 MG/ML
INJECTION INTRAVENOUS
Status: DISCONTINUED | OUTPATIENT
Start: 2024-08-13 | End: 2024-08-13

## 2024-08-13 RX ORDER — FENTANYL CITRATE 50 UG/ML
25 INJECTION, SOLUTION INTRAMUSCULAR; INTRAVENOUS EVERY 5 MIN PRN
Status: DISCONTINUED | OUTPATIENT
Start: 2024-08-13 | End: 2024-08-13 | Stop reason: HOSPADM

## 2024-08-13 RX ORDER — MIDAZOLAM HYDROCHLORIDE 1 MG/ML
INJECTION INTRAMUSCULAR; INTRAVENOUS
Status: DISCONTINUED | OUTPATIENT
Start: 2024-08-13 | End: 2024-08-13

## 2024-08-13 RX ORDER — KETOROLAC TROMETHAMINE 30 MG/ML
INJECTION, SOLUTION INTRAMUSCULAR; INTRAVENOUS
Status: DISCONTINUED | OUTPATIENT
Start: 2024-08-13 | End: 2024-08-13 | Stop reason: HOSPADM

## 2024-08-13 RX ORDER — CARBOXYMETHYLCELLULOSE SODIUM 10 MG/ML
GEL OPHTHALMIC
Status: DISCONTINUED | OUTPATIENT
Start: 2024-08-13 | End: 2024-08-13

## 2024-08-13 RX ORDER — PREGABALIN 75 MG/1
75 CAPSULE ORAL
Status: COMPLETED | OUTPATIENT
Start: 2024-08-13 | End: 2024-08-13

## 2024-08-13 RX ORDER — TRAMADOL HYDROCHLORIDE 50 MG/1
100 TABLET ORAL EVERY 6 HOURS PRN
Status: DISCONTINUED | OUTPATIENT
Start: 2024-08-13 | End: 2024-08-13 | Stop reason: HOSPADM

## 2024-08-13 RX ORDER — KETAMINE HYDROCHLORIDE 100 MG/ML
INJECTION, SOLUTION INTRAMUSCULAR; INTRAVENOUS
Status: DISCONTINUED | OUTPATIENT
Start: 2024-08-13 | End: 2024-08-13 | Stop reason: HOSPADM

## 2024-08-13 RX ORDER — PROMETHAZINE HYDROCHLORIDE 25 MG/1
25 TABLET ORAL EVERY 6 HOURS PRN
Status: DISCONTINUED | OUTPATIENT
Start: 2024-08-13 | End: 2024-08-13 | Stop reason: HOSPADM

## 2024-08-13 RX ADMIN — MIDAZOLAM HYDROCHLORIDE 2 MG: 2 INJECTION, SOLUTION INTRAMUSCULAR; INTRAVENOUS at 06:08

## 2024-08-13 RX ADMIN — FENTANYL CITRATE 25 MCG: 50 INJECTION, SOLUTION INTRAMUSCULAR; INTRAVENOUS at 10:08

## 2024-08-13 RX ADMIN — PREGABALIN 75 MG: 75 CAPSULE ORAL at 05:08

## 2024-08-13 RX ADMIN — ONDANSETRON 4 MG: 2 INJECTION INTRAMUSCULAR; INTRAVENOUS at 08:08

## 2024-08-13 RX ADMIN — SODIUM CHLORIDE, SODIUM GLUCONATE, SODIUM ACETATE, POTASSIUM CHLORIDE, MAGNESIUM CHLORIDE, SODIUM PHOSPHATE, DIBASIC, AND POTASSIUM PHOSPHATE: .53; .5; .37; .037; .03; .012; .00082 INJECTION, SOLUTION INTRAVENOUS at 08:08

## 2024-08-13 RX ADMIN — LIDOCAINE HYDROCHLORIDE 100 MG: 20 INJECTION INTRAVENOUS at 07:08

## 2024-08-13 RX ADMIN — FENTANYL CITRATE 100 MCG: 50 INJECTION, SOLUTION INTRAMUSCULAR; INTRAVENOUS at 07:08

## 2024-08-13 RX ADMIN — CEFAZOLIN 2 G: 2 INJECTION, POWDER, FOR SOLUTION INTRAMUSCULAR; INTRAVENOUS at 07:08

## 2024-08-13 RX ADMIN — METHOCARBAMOL 1000 MG: 500 TABLET ORAL at 09:08

## 2024-08-13 RX ADMIN — SODIUM CHLORIDE: 9 INJECTION, SOLUTION INTRAVENOUS at 05:08

## 2024-08-13 RX ADMIN — OXYCODONE HYDROCHLORIDE 10 MG: 10 TABLET, FILM COATED, EXTENDED RELEASE ORAL at 05:08

## 2024-08-13 RX ADMIN — CARBOXYMETHYLCELLULOSE SODIUM 4 DROP: 10 GEL OPHTHALMIC at 07:08

## 2024-08-13 RX ADMIN — Medication 30 MG: at 07:08

## 2024-08-13 RX ADMIN — DEXAMETHASONE SODIUM PHOSPHATE 8 MG: 4 INJECTION, SOLUTION INTRAMUSCULAR; INTRAVENOUS at 07:08

## 2024-08-13 RX ADMIN — FENTANYL CITRATE 25 MCG: 50 INJECTION, SOLUTION INTRAMUSCULAR; INTRAVENOUS at 09:08

## 2024-08-13 RX ADMIN — PROPOFOL 200 MG: 10 INJECTION, EMULSION INTRAVENOUS at 07:08

## 2024-08-13 RX ADMIN — FAMOTIDINE 20 MG: 10 INJECTION, SOLUTION INTRAVENOUS at 07:08

## 2024-08-13 RX ADMIN — OXYCODONE 5 MG: 5 TABLET ORAL at 09:08

## 2024-08-13 NOTE — OPERATIVE NOTE ADDENDUM
Certification of Assistant at Surgery       Surgery Date: 8/13/2024     Participating Surgeons:  Surgeons and Role:     * Sosa Ayers MD - Primary    Procedures:  Procedure(s) (LRB):  TENOTOMY,ELBOW,WITH DEBRIDEMENT (Right)    Assistant Surgeon's Certification of Necessity:  I understand that section 1842 (b) (6) (d) of the Social Security Act generally prohibits Medicare Part B reasonable charge payment for the services of assistants at surgery in teaching hospitals when qualified residents are available to furnish such services. I certify that the services for which payment is claimed were medically necessary, and that no qualified resident was available to perform the services. I further understand that these services are subject to post-payment review by the Medicare carrier.      Sosa Ayers MD    08/13/2024  8:16 AM

## 2024-08-13 NOTE — OP NOTE
DATE OF PROCEDURE:  08/13/2024     SURGEON: Sosa Ayers MD      ASSISTANT: ANA Henderson MD PGY6  ASSISTANT: ALESSANDRA Edgar MD PGY6  ASSISTANT: AMY Dick PA-C       PREOPERATIVE DIAGNOSES:   right   1. elbow medial epicondylitis     POSTOPERATIVE DIAGNOSES:   right   1. elbow medial epicondylitis     OPERATIONS:   right   1. elbow medial epicondyle debridement (CPT 56330)  2. elbow application of long arm splint (CPT 46665)    ANESTHESIA: General with local block 0.5% ropivicaine 30ml (5mg/ml), duramorph 1ml (10mg/ml), toradol 1ml  (30mg/ml)     BLOOD LOSS: None.      DRAINS: None.      COMPLICATIONS: None.      CONDITION ON TRANSFER: The patient was extubated and moved to the recovery room in stable condition, with compartments soft and capillary refill less than a second in all digits.      BRIEF INDICATION OF MEDICAL NECESSITY: The patient is a 48 y.o. year-old male   with history and physical examination findings consistent with the above. Nonoperative versus operative options were discussed. The risks and benefits were discussed with the patient. The patient acknowledged understanding and wished to proceed with operative intervention. Informed consent was obtained prior to the procedure. Reasonable expectations and potential complications were discussed and acknowledged, including but not limited to infection, bleeding, blood clots (DVT and/or PE), nerve injury, retear, instability, continued pain and stiffness. Possibility of nerve   injury was also discussed in which the patient agreed, understood and wished to proceed.      PROCEDURE IN DETAIL: The operative site was marked by the operative surgeon. The patient was then taken to the operating room, placed supine on the operating table in a comforable position. Lower extremities were placed in a comfortable well-padded position. Preoperative antibiotics were administered. Non-operative upper extremity was placed in a comfortable well-padded position. Operative  upper extremity was prepped and draped in the usual sterile fashion.      Incision based at the medial epicondyle was just anterior aspect of the medial epicondyle, one-third was made 1 cm proximally and 3 cm distally. Medial epicondyle was dissected and edges debrided in line with FCU fibers and peeled back and preserved.  Bone was ronguered and fenestrated with multiple K-wire passes in the standard fashion and irrigated. Tendon origin was closed with interrupted 0 Vicryl sutures.     The elbow and incisions were then copiously irrigated and fluid was gently extravasated.     Deep layer at elbow was closed using 3-0 Vicryl suture and skin was closed using 4-0 Monocryl suture in closing the skin nicely. Steri-Strips were placed with Mastisol. Sterile TENS unit pads were placed which were medically necessary for pain relief. Wounds were dressed with Xeroform, 4x4s, and cast padding, ABD and posterior mold. Splint was placed at 90 degrees of flexion and neutral rotation for 7-10 days.     The patient was extubated and moved to the recovery room in stable condition with compartments soft and capillary refill less than a second in all digits.      POSTOPERATIVE PLAN:   After this, the splint will be removed (at 10 days) and range of motion will be initiated upon removal of the initial postop splint.  This was discussed this with the patient's family after surgery.

## 2024-08-13 NOTE — ANESTHESIA POSTPROCEDURE EVALUATION
Anesthesia Post Evaluation    Patient: Jaime Mcpherson    Procedure(s) Performed: Procedure(s) (LRB):  TENOTOMY,ELBOW,WITH DEBRIDEMENT (Right)    Final Anesthesia Type: general      Patient location during evaluation: PACU  Patient participation: Yes- Able to Participate  Level of consciousness: awake and alert and oriented  Pain management: adequate  Airway patency: patent    PONV status at discharge: No PONV  Anesthetic complications: no      Cardiovascular status: blood pressure returned to baseline and hemodynamically stable  Respiratory status: unassisted  Hydration status: euvolemic  Follow-up not needed.              Vitals Value Taken Time   /76 08/13/24 1001   Temp 36.6 °C (97.9 °F) 08/13/24 1000   Pulse 66 08/13/24 1009   Resp 14 08/13/24 1008   SpO2 95 % 08/13/24 1009   Vitals shown include unfiled device data.      Event Time   Out of Recovery 09:57:00         Pain/Eduardo Score: Pain Rating Prior to Med Admin: 5 (8/13/2024 10:01 AM)  Pain Rating Post Med Admin: 5 (8/13/2024  9:45 AM)  Eduardo Score: 10 (8/13/2024  9:30 AM)

## 2024-08-13 NOTE — DISCHARGE SUMMARY
"[place] - Surgery ([facility])  Brief Operative Note    Surgery Date: 8/13/2024     Surgeons and Role:     * Sosa Ayers MD - Primary    Assisting Surgeon: None    Pre-op Diagnosis:  Medial epicondylitis of elbow, right [M77.01]    Post-op Diagnosis:  Post-Op Diagnosis Codes:     * Medial epicondylitis of elbow, right [M77.01]    Procedure(s) (LRB):  TENOTOMY,ELBOW,WITH DEBRIDEMENT (Right)    Anesthesia: General    Operative Findings: Medial epicondylitis, flexor-pronator tendon scarring    Estimated Blood Loss: * No values recorded between 8/13/2024  7:32 AM and 8/13/2024  8:34 AM *         Specimens:   Specimen (24h ago, onward)      None              Discharge Note    OUTCOME: Patient tolerated treatment/procedure well without complication and is now ready for discharge.    DISPOSITION: Home or Self Care    FINAL DIAGNOSIS:  <principal problem not specified>    FOLLOWUP: In clinic    DISCHARGE INSTRUCTIONS:    Discharge Procedure Orders   SLING FOR HOME USE   Order Comments: Post-op sling     Order Specific Question Answer Comments   Height: 6' 1" (1.854 m)    Weight: 71.2 kg (156 lb 15.5 oz)    Does patient have medical equipment at home? none    Length of need (1-99 months): 12        "

## 2024-08-13 NOTE — PLAN OF CARE
Patient is AAO and VSS.  Tolerating PO and states pain is tolerable.  Dressing CDI.  Patient states they are ready for d/c.  IV removed.  Catheter tip intact.  Caregiver at bedside.  Discharge instructions reviewed and copy given to the patient and caregiver.  Questions answered.  Both verbalized understanding.  Medication delivered to bedside and reviewed. No DME needed. Sling in place. Patient wheeled to car by RN with NAD noted.

## 2024-08-13 NOTE — TRANSFER OF CARE
"Anesthesia Transfer of Care Note    Patient: Jaime Mcpherson    Procedure(s) Performed: Procedure(s) (LRB):  TENOTOMY,ELBOW,WITH DEBRIDEMENT (Right)    Patient location: PACU    Anesthesia Type: general    Transport from OR: Transported from OR on 6-10 L/min O2 by face mask with adequate spontaneous ventilation    Post pain: adequate analgesia    Post assessment: no apparent anesthetic complications and tolerated procedure well    Post vital signs: stable    Level of consciousness: awake, alert and oriented    Nausea/Vomiting: no nausea/vomiting    Complications: none    Transfer of care protocol was followed      Last vitals: Visit Vitals  /74 (BP Location: Left arm, Patient Position: Lying)   Pulse 65   Temp 36.7 °C (98.1 °F) (Oral)   Resp 18   Ht 6' 1" (1.854 m)   Wt 70.8 kg (156 lb)   SpO2 97%   BMI 20.58 kg/m²     "

## 2024-08-13 NOTE — DISCHARGE SUMMARY
Certification of Assistant at Surgery       Surgery Date: 8/13/2024     Participating Surgeons:  Surgeons and Role:     * Sosa Ayers MD - Primary    Procedures:  Procedure(s) (LRB):  TENOTOMY,ELBOW,WITH DEBRIDEMENT (Right)    Assistant Surgeon's Certification of Necessity:  I understand that section 1842 (b) (6) (d) of the Social Security Act generally prohibits Medicare Part B reasonable charge payment for the services of assistants at surgery in teaching hospitals when qualified residents are available to furnish such services. I certify that the services for which payment is claimed were medically necessary, and that no qualified resident was available to perform the services. I further understand that these services are subject to post-payment review by the Medicare carrier.      Erasmo Henderson MD    08/13/2024  8:36 AM

## 2024-08-13 NOTE — ANESTHESIA PROCEDURE NOTES
Intubation    Date/Time: 8/13/2024 7:13 AM    Performed by: Cuauhtemoc Vyas CRNA  Authorized by: Eufemia Calvo MD    Intubation:     Induction:  Intravenous    Intubated:  Postinduction    Mask Ventilation:  Easy mask    Attempts:  1    Attempted By:  CRNA    Difficult Airway Encountered?: No      Complications:  None    Airway Device:  Supraglottic airway/LMA    Airway Device Size:  4.0    Style/Cuff Inflation:  Cuffed    Secured at:  The lips    Placement Verified By:  Capnometry    Complicating Factors:  None    Findings Post-Intubation:  BS equal bilateral and atraumatic/condition of teeth unchanged

## 2024-08-22 ENCOUNTER — TELEPHONE (OUTPATIENT)
Dept: SPORTS MEDICINE | Facility: CLINIC | Age: 49
End: 2024-08-22
Payer: COMMERCIAL

## 2024-08-22 NOTE — TELEPHONE ENCOUNTER
----- Message from [employer] sent at 8/22/2024  9:05 AM CDT -----  Regarding: Op Report  Contact: Moira [phone]  Moira with [facility] is calling in ref to receiving op report to assist pt in his PT. Best Call Back Number:   Moira [phone]    [phone]

## 2024-08-23 ENCOUNTER — OFFICE VISIT (OUTPATIENT)
Dept: SPORTS MEDICINE | Facility: CLINIC | Age: 49
End: 2024-08-23
Payer: COMMERCIAL

## 2024-08-23 VITALS
WEIGHT: 153.13 LBS | HEART RATE: 105 BPM | BODY MASS INDEX: 20.2 KG/M2 | SYSTOLIC BLOOD PRESSURE: 116 MMHG | DIASTOLIC BLOOD PRESSURE: 78 MMHG

## 2024-08-23 DIAGNOSIS — Z09 S/P ORTHOPEDIC SURGERY, FOLLOW-UP EXAM: Primary | ICD-10-CM

## 2024-08-23 DIAGNOSIS — M77.01 MEDIAL EPICONDYLITIS OF ELBOW, RIGHT: ICD-10-CM

## 2024-08-23 DIAGNOSIS — M25.521 RIGHT ELBOW PAIN: ICD-10-CM

## 2024-08-23 PROCEDURE — 99999 PR PBB SHADOW E&M-EST. PATIENT-LVL III: CPT | Mod: PBBFAC,,, | Performed by: PHYSICIAN ASSISTANT

## 2024-08-23 PROCEDURE — 1160F RVW MEDS BY RX/DR IN RCRD: CPT | Mod: CPTII,S$GLB,, | Performed by: PHYSICIAN ASSISTANT

## 2024-08-23 PROCEDURE — 3074F SYST BP LT 130 MM HG: CPT | Mod: CPTII,S$GLB,, | Performed by: PHYSICIAN ASSISTANT

## 2024-08-23 PROCEDURE — 99024 POSTOP FOLLOW-UP VISIT: CPT | Mod: S$GLB,,, | Performed by: PHYSICIAN ASSISTANT

## 2024-08-23 PROCEDURE — 3078F DIAST BP <80 MM HG: CPT | Mod: CPTII,S$GLB,, | Performed by: PHYSICIAN ASSISTANT

## 2024-08-23 PROCEDURE — 1159F MED LIST DOCD IN RCRD: CPT | Mod: CPTII,S$GLB,, | Performed by: PHYSICIAN ASSISTANT

## 2024-08-23 PROCEDURE — 3044F HG A1C LEVEL LT 7.0%: CPT | Mod: CPTII,S$GLB,, | Performed by: PHYSICIAN ASSISTANT

## 2024-08-23 RX ORDER — TRAMADOL HYDROCHLORIDE 50 MG/1
50-100 TABLET ORAL EVERY 6 HOURS PRN
Qty: 21 TABLET | Refills: 0 | Status: SHIPPED | OUTPATIENT
Start: 2024-08-23

## 2024-08-23 RX ORDER — OXYCODONE AND ACETAMINOPHEN 10; 325 MG/1; MG/1
TABLET ORAL
Qty: 16 TABLET | Refills: 0 | Status: SHIPPED | OUTPATIENT
Start: 2024-08-23

## 2024-09-04 NOTE — PROGRESS NOTES
Chief Complaint: Right elbow pain    HISTORY OF PRESENT ILLNESS:   Pt is here today for post-operative followup of elbow surgery.  he is doing well.  We have reviewed patient's findings and discussed plan of care and future treatment options.      Tolerating pain well. Still taking occasional pain medications.   Pain at 3/10 today.   Wearing splint which is in place.  No issues reported.          DATE OF PROCEDURE:  08/13/2024  SURGEON: Sosa Ayers MD   OPERATIONS:   right   1. elbow medial epicondyle debridement (CPT 46437)  2. elbow application of long arm splint (CPT 24860)                                                                                           PHYSICAL EXAMINATION:     Incision site healed well  No evidence of any erythema, infection or induration  elbow Range of motion NT  No\ effusion  2+ Radial pulses  Minimal swelling                                                                               ASSESSMENT:                                                                                                                                               1. Status post above, doing well.                                                                                                                               PLAN:                                                                                                                                                     1. PT to start now and follow [facility] rehab protocol; wean narcotics  Hinged elbow brace provided today. Set at  degrees to be advance by PT per protocol  2. Emphasized scapular function. Keep incision clean and dry for another 4 days.  3. I have discussed return to activity in detail.  4. Patient will see us back in 4 weeks.                                      5. Discussed case with PT.    All questions were answered, surgical technique was reviewed and interpreted, and patient should contact us with any questions or concerns in the  interim.

## 2024-09-10 ENCOUNTER — TELEPHONE (OUTPATIENT)
Dept: SPORTS MEDICINE | Facility: CLINIC | Age: 49
End: 2024-09-10
Payer: COMMERCIAL
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
## 2024-10-04 ENCOUNTER — PATIENT MESSAGE (OUTPATIENT)
Dept: SPORTS MEDICINE | Facility: CLINIC | Age: 49
End: 2024-10-04
Payer: COMMERCIAL

## 2024-10-07 ENCOUNTER — OFFICE VISIT (OUTPATIENT)
Dept: SPORTS MEDICINE | Facility: CLINIC | Age: 49
End: 2024-10-07
Payer: COMMERCIAL

## 2024-10-07 VITALS
BODY MASS INDEX: 21.2 KG/M2 | HEIGHT: 73 IN | DIASTOLIC BLOOD PRESSURE: 85 MMHG | WEIGHT: 160 LBS | SYSTOLIC BLOOD PRESSURE: 118 MMHG | HEART RATE: 74 BPM

## 2024-10-07 DIAGNOSIS — M77.01 MEDIAL EPICONDYLITIS OF ELBOW, RIGHT: Primary | ICD-10-CM

## 2024-10-07 PROCEDURE — 99024 POSTOP FOLLOW-UP VISIT: CPT | Mod: S$GLB,,, | Performed by: ORTHOPAEDIC SURGERY

## 2024-10-07 PROCEDURE — 99999 PR PBB SHADOW E&M-EST. PATIENT-LVL III: CPT | Mod: PBBFAC,,, | Performed by: ORTHOPAEDIC SURGERY

## 2024-10-07 PROCEDURE — 3079F DIAST BP 80-89 MM HG: CPT | Mod: CPTII,S$GLB,, | Performed by: ORTHOPAEDIC SURGERY

## 2024-10-07 PROCEDURE — 1159F MED LIST DOCD IN RCRD: CPT | Mod: CPTII,S$GLB,, | Performed by: ORTHOPAEDIC SURGERY

## 2024-10-07 PROCEDURE — 3044F HG A1C LEVEL LT 7.0%: CPT | Mod: CPTII,S$GLB,, | Performed by: ORTHOPAEDIC SURGERY

## 2024-10-07 PROCEDURE — 3074F SYST BP LT 130 MM HG: CPT | Mod: CPTII,S$GLB,, | Performed by: ORTHOPAEDIC SURGERY

## 2024-10-07 NOTE — PROGRESS NOTES
Chief Complaint: Right elbow pain    HISTORY OF PRESENT ILLNESS:   Pt is here today for post-operative followup of elbow surgery.  he is doing well.  We have reviewed patient's findings and discussed plan of care and future treatment options.      Tolerating pain well.   Pain at 0/10 today.   No issues reported.   SANE 70       DATE OF PROCEDURE:  08/13/2024  SURGEON: Sosa Ayers MD   OPERATIONS:   right   1. elbow medial epicondyle debridement (CPT 45656)  2. elbow application of long arm splint (CPT 61149)                SANE 100  0/10  Preop 7/10  Preop 50 SANE  DATE OF PROCEDURE: 07/02/2024  SURGEON: Sosa Ayers M.D.   OPERATIONS:   right  1. elbow excision of neuroma, posterior branch, posterior cutaneous nerve   of the forearm (CPT 06204).   2. elbow implantation of nerve into bone or muscle (posterior branch and   posterior cutaneous nerve of the forearm into triceps (CPT 66932).                                                                                   PHYSICAL EXAMINATION:     Incision site healed well  No evidence of any erythema, infection or induration  elbow Range of motion NT  No\ effusion  2+ Radial pulses  Minimal swelling  Full rom  Slight pain with resisted wrist flexion                                                                               ASSESSMENT:                                                                                                                                               1. Status post above, doing well.                                                                                                                               PLAN:                                                                                                                                                     1. PT   2. Emphasized scapular function.   3. I have discussed return to activity in detail.  4. Patient will see us back in 6 weeks.                                      5. Discussed case  with PT.    All questions were answered, surgical technique was reviewed and interpreted, and patient should contact us with any questions or concerns in the interim.

## 2024-10-23 ENCOUNTER — PATIENT MESSAGE (OUTPATIENT)
Dept: INTERNAL MEDICINE | Facility: CLINIC | Age: 49
End: 2024-10-23
Payer: COMMERCIAL

## 2024-10-28 ENCOUNTER — TELEPHONE (OUTPATIENT)
Dept: FAMILY MEDICINE | Facility: CLINIC | Age: 49
End: 2024-10-28
Payer: COMMERCIAL

## 2024-11-18 ENCOUNTER — OFFICE VISIT (OUTPATIENT)
Dept: SPORTS MEDICINE | Facility: CLINIC | Age: 49
End: 2024-11-18
Payer: COMMERCIAL

## 2024-11-18 VITALS
DIASTOLIC BLOOD PRESSURE: 80 MMHG | HEIGHT: 73 IN | SYSTOLIC BLOOD PRESSURE: 129 MMHG | BODY MASS INDEX: 21.68 KG/M2 | HEART RATE: 64 BPM | WEIGHT: 163.56 LBS

## 2024-11-18 DIAGNOSIS — M77.01 MEDIAL EPICONDYLITIS OF ELBOW, RIGHT: Primary | ICD-10-CM

## 2024-11-18 PROCEDURE — 99024 POSTOP FOLLOW-UP VISIT: CPT | Mod: S$GLB,,, | Performed by: ORTHOPAEDIC SURGERY

## 2024-11-18 PROCEDURE — 3044F HG A1C LEVEL LT 7.0%: CPT | Mod: CPTII,S$GLB,, | Performed by: ORTHOPAEDIC SURGERY

## 2024-11-18 PROCEDURE — 99999 PR PBB SHADOW E&M-EST. PATIENT-LVL III: CPT | Mod: PBBFAC,,, | Performed by: ORTHOPAEDIC SURGERY

## 2024-11-18 PROCEDURE — 3079F DIAST BP 80-89 MM HG: CPT | Mod: CPTII,S$GLB,, | Performed by: ORTHOPAEDIC SURGERY

## 2024-11-18 PROCEDURE — 3074F SYST BP LT 130 MM HG: CPT | Mod: CPTII,S$GLB,, | Performed by: ORTHOPAEDIC SURGERY

## 2024-11-18 PROCEDURE — 1159F MED LIST DOCD IN RCRD: CPT | Mod: CPTII,S$GLB,, | Performed by: ORTHOPAEDIC SURGERY

## 2024-11-18 NOTE — PROGRESS NOTES
Chief Complaint: Right elbow pain    HISTORY OF PRESENT ILLNESS:   Pt is here today for post-operative followup of elbow surgery.  he is doing well.  We have reviewed patient's findings and discussed plan of care and future treatment options.      Tolerating pain well.   Pain at 0/10 today.   No issues reported.   SANE 90       DATE OF PROCEDURE:  08/13/2024  SURGEON: Sosa Ayers MD   OPERATIONS:   right   1. elbow medial epicondyle debridement (CPT 57665)  2. elbow application of long arm splint (CPT 07416)                SANE 100  0/10  Preop 7/10  Preop 50 SANE  DATE OF PROCEDURE: 07/02/2024  SURGEON: Sosa Ayers M.D.   OPERATIONS:   right  1. elbow excision of neuroma, posterior branch, posterior cutaneous nerve   of the forearm (CPT 97379).   2. elbow implantation of nerve into bone or muscle (posterior branch and   posterior cutaneous nerve of the forearm into triceps (CPT 66365).                                                                                   PHYSICAL EXAMINATION:     Incision site healed well  No evidence of any erythema, infection or induration  elbow Range of motion NT  No effusion  2+ Radial pulses  Minimal swelling  Full rom  no pain with resisted wrist flexion                                                                               ASSESSMENT:                                                                                                                                               1. Status post above, doing well.                                                                                                                               PLAN:                                                                                                                                                     1. PT   2. Emphasized scapular function.   3. I have discussed return to activity in detail.  4. Patient will see us back in 12 weeks.                                      5. Discussed case with  PT.    All questions were answered, surgical technique was reviewed and interpreted, and patient should contact us with any questions or concerns in the interim.

## 2025-01-15 ENCOUNTER — OFFICE VISIT (OUTPATIENT)
Dept: FAMILY MEDICINE | Facility: CLINIC | Age: 50
End: 2025-01-15
Payer: COMMERCIAL

## 2025-01-15 VITALS
DIASTOLIC BLOOD PRESSURE: 76 MMHG | SYSTOLIC BLOOD PRESSURE: 122 MMHG | OXYGEN SATURATION: 96 % | HEART RATE: 79 BPM | HEIGHT: 73 IN | WEIGHT: 166 LBS | RESPIRATION RATE: 16 BRPM | BODY MASS INDEX: 22 KG/M2

## 2025-01-15 DIAGNOSIS — M54.50 LUMBAR PAIN: Primary | ICD-10-CM

## 2025-01-15 DIAGNOSIS — M79.661 PAIN OF RIGHT CALF: ICD-10-CM

## 2025-01-15 PROCEDURE — 3078F DIAST BP <80 MM HG: CPT | Mod: S$GLB,,, | Performed by: FAMILY MEDICINE

## 2025-01-15 PROCEDURE — 3074F SYST BP LT 130 MM HG: CPT | Mod: S$GLB,,, | Performed by: FAMILY MEDICINE

## 2025-01-15 PROCEDURE — 99999 PR PBB SHADOW E&M-EST. PATIENT-LVL IV: CPT | Mod: PBBFAC,,, | Performed by: FAMILY MEDICINE

## 2025-01-15 PROCEDURE — 1159F MED LIST DOCD IN RCRD: CPT | Mod: S$GLB,,, | Performed by: FAMILY MEDICINE

## 2025-01-15 PROCEDURE — G2211 COMPLEX E/M VISIT ADD ON: HCPCS | Mod: S$GLB,,, | Performed by: FAMILY MEDICINE

## 2025-01-15 PROCEDURE — 99214 OFFICE O/P EST MOD 30 MIN: CPT | Mod: S$GLB,,, | Performed by: FAMILY MEDICINE

## 2025-01-15 PROCEDURE — 3008F BODY MASS INDEX DOCD: CPT | Mod: S$GLB,,, | Performed by: FAMILY MEDICINE

## 2025-01-15 RX ORDER — TIZANIDINE 4 MG/1
4 TABLET ORAL EVERY 8 HOURS
Qty: 30 TABLET | Refills: 1 | Status: SHIPPED | OUTPATIENT
Start: 2025-01-15 | End: 2025-01-31

## 2025-01-15 RX ORDER — DICLOFENAC SODIUM 75 MG/1
75 TABLET, DELAYED RELEASE ORAL 2 TIMES DAILY
Qty: 30 TABLET | Refills: 1 | Status: SHIPPED | OUTPATIENT
Start: 2025-01-15

## 2025-01-15 RX ORDER — TRAZODONE HYDROCHLORIDE 150 MG/1
150 TABLET ORAL NIGHTLY
Qty: 90 TABLET | Refills: 3 | Status: CANCELLED | OUTPATIENT
Start: 2025-01-15 | End: 2026-01-15

## 2025-01-15 NOTE — PROGRESS NOTES
Patient ID: Jaime Mcpherson is a 49 y.o. male.    Chief Complaint: Referral (For back problem , calf strain )    History of Present Illness    CHIEF COMPLAINT:  Jaime presents today for calf strain and chronic lower back pain.    CALF STRAIN:  He developed a calf strain four days ago while driving with significant pain in the affected area. He was evaluated at urgent care where he was diagnosed with a calf strain rather than a blood clot, though no ultrasound was performed.    CHRONIC BACK PAIN:  He reports chronic lower back pain of several years duration that significantly affects sleep and is particularly notable when attempting to stand after prolonged sitting. He experiences occasional radiating pain down leg. Previous treatment with steroid Dosepak provided temporary relief. No previous x-ray imaging of the back.    SURGICAL HISTORY:  He underwent right elbow surgery on 07- for excision of a neuroma on the posterior cutaneous nerve of the forearm with nerve implantation onto bone or muscle. At post-operative visit on 11/18/2024, he was doing well and participating in physical therapy.    MEDICATIONS:  He is currently taking baby aspirin and Trazodone.    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 He frequently engages in sports and running activities.      ROS:  ROS as indicated in HPI.         Physical Exam    General: No acute distress. Well-developed. Well-nourished.  Eyes: EOMI. Sclerae anicteric.  HENT: Normocephalic. Atraumatic. Nares patent. Moist oral mucosa.  Cardiovascular: Regular rate. Regular rhythm. No murmurs. No rubs. No gallops. Normal S1, S2.  Respiratory: Normal respiratory effort. Clear to auscultation bilaterally. No rales. No rhonchi. No wheezing.  Musculoskeletal: No  obvious deformity. Tight muscle on one side.  Extremities: No lower extremity edema.  Neurological: Alert & oriented x3. No slurred  speech. Normal gait.  Psychiatric: Normal mood. Normal affect. Good insight. Good judgment.  Skin: Warm. Dry. No rash.         Assessment & Plan    IMPRESSION:  - Evaluated calf pain, considering strain vs. blood clot  - Determined ultrasound necessary to rule out blood clot, despite low probability given patient on baby aspirin  - Assessed chronic lower back pain, suspecting potential lumbar disc issues or pinched nerves  - Considered conservative therapy (medication and physical therapy) as initial approach for back pain  - Planned for lumbar spine x-ray to evaluate back pain, with possibility of MRI if conservative measures fail    CALF STRAIN:  - Evaluated the patient's calf strain, present for 4 days, with pain but no specific inciting event.  - Examined the calf, noting muscle tightness.  - Considered possibility of blood clot but deemed unlikely based on symptoms.  - Ordered ultrasound of calf to rule out blood clot and assess strain severity.  - Explained difference between calf strain and blood clot symptoms to the patient.  - Instructed the patient to alternate icing calf and avoid weight-bearing for 4-5 days.  - Prescribed diclofenac, to be taken twice daily with food for pain and inflammation.  - Prescribed tizanidine, a muscle relaxer, to be taken up to 3 times daily, advising nighttime use due to potential drowsiness.  - Referred the patient to physical therapy for calf strain management.    CHRONIC LOWER BACK PAIN:  - Evaluated the patient's chronic lower back problem, affecting sleep and mobility for years.  - Performed physical exam, noting back pain and muscle tightness.  - Ordered x-ray of lumbar spine for further assessment.  - Prescribed diclofenac twice daily for pain management.  - Prescribed tizanidine as a muscle relaxer.  - Referred the patient to physical therapy for back pain management.  - Considered MRI if conservative therapy fails.  - Discussed potential use of epidural steroids if  needed.    POTENTIAL SCIATICA:  - Noted patient's report of occasional pain radiating down the leg, indicating possible sciatic nerve involvement.  - Discussed how epidural steroids can be more effective for pinched nerves by targeting the affected area directly.  - Educated the patient about potential sciatic nerve involvement in back pain with leg symptoms.  - Prescribed diclofenac and muscle relaxers for pain management.  - Recommend physical therapy as part of the treatment plan for back pain with potential sciatica.    INSOMNIA:  - Continued Trazodone for insomnia management, noting two refills remaining from a prescription written in May for a year's supply.    ELBOW SURGERY:  - Noted patient's elbow surgery on 07-, which included implantation of a nerve onto bone or muscle.  - Reviewed follow-up visit on 11/18/2024, where the patient was doing well and undergoing physical therapy.    ALLERGY:  - Noted patient's allergy to wasp venom.    MEDICATIONS/SUPPLEMENTS:  - Continued baby aspirin regimen.    FOLLOW UP:  - Follow up using patient portal to report on medication effectiveness.  - Contact the office if medications are not effective, MRI will be ordered without need for office visit.         Plan:          Lumbar pain  -     X-Ray Lumbar Spine 5 View; Future; Expected date: 01/15/2025  -     Ambulatory referral/consult to Physical/Occupational Therapy; Future; Expected date: 01/22/2025  -     tiZANidine (ZANAFLEX) 4 MG tablet; Take 1 tablet (4 mg total) by mouth every 8 (eight) hours.  Dispense: 30 tablet; Refill: 1  -     diclofenac (VOLTAREN) 75 MG EC tablet; Take 1 tablet (75 mg total) by mouth 2 (two) times daily.  Dispense: 30 tablet; Refill: 1    Pain of right calf  -     US Lower Extremity Veins Right; Future; Expected date: 01/15/2025  -     tiZANidine (ZANAFLEX) 4 MG tablet; Take 1 tablet (4 mg total) by mouth every 8 (eight) hours.  Dispense: 30 tablet; Refill: 1  -     diclofenac  (VOLTAREN) 75 MG EC tablet; Take 1 tablet (75 mg total) by mouth 2 (two) times daily.  Dispense: 30 tablet; Refill: 1        Follow up in about 6 months (around 7/15/2025), or if symptoms worsen or fail to improve.    This note was generated with the assistance of ambient listening technology. Verbal consent was obtained by the patient and accompanying visitor(s) for the recording of patient appointment to facilitate this note. I attest to having reviewed and edited the generated note for accuracy, though some syntax or spelling errors may persist. Please contact the author of this note for any clarification.

## 2025-01-16 ENCOUNTER — HOSPITAL ENCOUNTER (OUTPATIENT)
Dept: RADIOLOGY | Facility: HOSPITAL | Age: 50
Discharge: HOME | End: 2025-01-16
Attending: FAMILY MEDICINE
Payer: COMMERCIAL

## 2025-01-16 DIAGNOSIS — M54.50 LUMBAR PAIN: ICD-10-CM

## 2025-01-16 DIAGNOSIS — M79.661 PAIN OF RIGHT CALF: ICD-10-CM

## 2025-01-16 PROCEDURE — 93971 EXTREMITY STUDY: CPT | Mod: TC,RT

## 2025-01-16 PROCEDURE — 93971 EXTREMITY STUDY: CPT | Mod: 26,RT,, | Performed by: RADIOLOGY

## 2025-01-16 PROCEDURE — 72110 X-RAY EXAM L-2 SPINE 4/>VWS: CPT | Mod: TC

## 2025-01-16 PROCEDURE — 72110 X-RAY EXAM L-2 SPINE 4/>VWS: CPT | Mod: 26,,, | Performed by: RADIOLOGY

## 2025-01-27 ENCOUNTER — TELEPHONE (OUTPATIENT)
Dept: FAMILY MEDICINE | Facility: CLINIC | Age: 50
End: 2025-01-27
Payer: COMMERCIAL

## 2025-01-27 NOTE — TELEPHONE ENCOUNTER
"----- Message from Kaitlin sent at 1/25/2025 11:04 AM CST -----  Regarding: NP PHYSICAL THERAPY MACIEAL  Good day to you:    The [facility] received your order to get the attached patient scheduled for an evaluation. We have reached out to the patient to schedule them for an evaluation; however, we have been unsuccessful in reaching the patient.      We wanted you to be aware that your patient has not started therapy. If you speak with them again about starting therapy please have them reach out to us at [phone] to get scheduled.      Sincerely,  Kaitlin "Ms. Gerard" Saxman  Access Navigator  [phone] FAX: [phone]  [email or web address]  "

## 2025-01-31 DIAGNOSIS — M79.661 PAIN OF RIGHT CALF: ICD-10-CM

## 2025-01-31 DIAGNOSIS — M54.50 LUMBAR PAIN: ICD-10-CM

## 2025-01-31 RX ORDER — TIZANIDINE 4 MG/1
4 TABLET ORAL EVERY 8 HOURS
Qty: 30 TABLET | Refills: 1 | Status: SHIPPED | OUTPATIENT
Start: 2025-01-31

## 2025-01-31 NOTE — TELEPHONE ENCOUNTER
No care due was identified.  [facility] Embedded Care Due Messages. Reference number: [id number].   1/31/2025 3:36:18 AM CST

## 2025-02-19 ENCOUNTER — OFFICE VISIT (OUTPATIENT)
Dept: SPORTS MEDICINE | Facility: CLINIC | Age: 50
End: 2025-02-19
Payer: COMMERCIAL

## 2025-02-19 ENCOUNTER — TELEPHONE (OUTPATIENT)
Dept: SPORTS MEDICINE | Facility: CLINIC | Age: 50
End: 2025-02-19
Payer: COMMERCIAL

## 2025-02-19 VITALS
SYSTOLIC BLOOD PRESSURE: 129 MMHG | HEIGHT: 73 IN | WEIGHT: 165.88 LBS | DIASTOLIC BLOOD PRESSURE: 86 MMHG | HEART RATE: 67 BPM | BODY MASS INDEX: 21.98 KG/M2

## 2025-02-19 DIAGNOSIS — Z09 S/P ORTHOPEDIC SURGERY, FOLLOW-UP EXAM: Primary | ICD-10-CM

## 2025-02-19 NOTE — PROGRESS NOTES
Chief Complaint: Right elbow pain    HISTORY OF PRESENT ILLNESS:   Pt is here today for post-operative followup of elbow surgery.  he is doing well.  We have reviewed patient's findings and discussed plan of care and future treatment options.      Tolerating pain well.   Pain at 0/10 today.   No issues reported.   SANE 95       DATE OF PROCEDURE:  08/13/2024  SURGEON: Sosa Ayers MD   OPERATIONS:   right   1. elbow medial epicondyle debridement (CPT 95323)  2. elbow application of long arm splint (CPT 67191)                SANE 100  0/10  Preop 7/10  Preop 50 SANE  DATE OF PROCEDURE: 07/02/2024  SURGEON: Sosa Ayers M.D.   OPERATIONS:   right  1. elbow excision of neuroma, posterior branch, posterior cutaneous nerve   of the forearm (CPT 92051).   2. elbow implantation of nerve into bone or muscle (posterior branch and   posterior cutaneous nerve of the forearm into triceps (CPT 64637).    Review of Systems   Constitution: Negative. Negative for chills, fever and night sweats.   HENT: Negative for congestion and headaches.    Eyes: Negative for blurred vision, left vision loss and right vision loss.   Cardiovascular: Negative for chest pain and syncope.   Respiratory: Negative for cough and shortness of breath.    Endocrine: Negative for polydipsia, polyphagia and polyuria.   Hematologic/Lymphatic: Negative for bleeding problem. Does not bruise/bleed easily.   Skin: Negative for dry skin, itching and rash.   Musculoskeletal: Negative for falls and muscle weakness.   Gastrointestinal: Negative for abdominal pain and bowel incontinence.   Genitourinary: Negative for bladder incontinence and nocturia.   Neurological: Negative for disturbances in coordination, loss of balance and seizures.   Psychiatric/Behavioral: Negative for depression. The patient does not have insomnia.    Allergic/Immunologic: Negative for hives and persistent infections.     PAST MEDICAL HISTORY:   Past Medical History:   Diagnosis Date    Bee  "sting allergy     GERD (gastroesophageal reflux disease)      PAST SURGICAL HISTORY:   Past Surgical History:   Procedure Laterality Date    EXCISION, NEUROMA Right 7/2/2024    Procedure: EXCISION, NEUROMA;  Surgeon: Sosa Ayers MD;  Location: [facility] OR;  Service: Orthopedics;  Laterality: Right;    INSERTION-IMPLANT Right 7/2/2024    Procedure: INSERTION-IMPLANT;  Surgeon: Sosa Ayers MD;  Location: [facility] OR;  Service: Orthopedics;  Laterality: Right;    MOUTH SURGERY      TENOTOMY, ELBOW, WITH DEBRIDEMENT AND TENDON REPAIR Right 8/13/2024    Procedure: TENOTOMY,ELBOW,WITH DEBRIDEMENT;  Surgeon: Sosa Ayers MD;  Location: [facility] OR;  Service: Orthopedics;  Laterality: Right;    WISDOM TOOTH EXTRACTION       FAMILY HISTORY:   Family History   Problem Relation Name Age of Onset    Cancer Mother adopted 55        lung/smoker    Hypothyroidism Mother adopted     Lupus Father      No Known Problems Brother      Stroke Paternal Grandmother      No Known Problems Paternal Grandfather      No Known Problems Daughter      No Known Problems Son      No Known Problems Paternal Uncle      Allergic rhinitis Neg Hx      Allergies Neg Hx      Angioedema Neg Hx      Asthma Neg Hx      Atopy Neg Hx      Eczema Neg Hx      Immunodeficiency Neg Hx      Rhinitis Neg Hx      Urticaria Neg Hx      Glaucoma Neg Hx      Macular degeneration Neg Hx       SOCIAL HISTORY: Social History[1]    MEDICATIONS: Current Medications[2]  ALLERGIES:   Review of patient's allergies indicates:   Allergen Reactions    Venom-wasp Anaphylaxis       VITAL SIGNS: /86   Pulse 67   Ht 6' 1" (1.854 m)   Wt 75.3 kg (165 lb 14.3 oz)   BMI 21.89 kg/m²                                                                                     PHYSICAL EXAMINATION:     Incision site healed well  No evidence of any erythema, infection or induration  elbow Range of motion NT  No effusion  2+ Radial pulses  Minimal swelling  Full rom  no pain with resisted wrist " flexion                                                                               ASSESSMENT:                                                                                                                                               1. Status post above, doing well.                                                                                                                               PLAN:                                                                                                                                                     1. PT   2. Emphasized scapular function.   3. I have discussed return to activity in detail.  4. Patient will see us back in 12 weeks.                                      5. Discussed case with PT.    All questions were answered, surgical technique was reviewed and interpreted, and patient should contact us with any questions or concerns in the interim.                                                                                                                   [1]   Social History  Socioeconomic History    Marital status:    Tobacco Use    Smoking status: Former     Types: Cigars     Quit date: 2024     Years since quittin.2     Passive exposure: Past    Smokeless tobacco: Former    Tobacco comments:     electric / lives in [place] [phone]   Substance and Sexual Activity    Alcohol use: Yes     Alcohol/week: 2.0 standard drinks of alcohol     Types: 2 Standard drinks or equivalent per week     Comment: occ    Drug use: No    Sexual activity: Yes     Partners: Female     Birth control/protection: I.U.D.     Social Drivers of Health     Financial Resource Strain: Low Risk  (6/10/2024)    Overall Financial Resource Strain (CARDIA)     Difficulty of Paying Living Expenses: Not hard at all   Food Insecurity: No Food Insecurity (6/10/2024)    Hunger Vital Sign     Worried About Running Out of Food in the Last Year: Never true     Ran Out of Food in the  Last Year: Never true   Physical Activity: Sufficiently Active (6/10/2024)    Exercise Vital Sign     Days of Exercise per Week: 5 days     Minutes of Exercise per Session: 60 min   Stress: Stress Concern Present (6/10/2024)    [employer] - Occupational Stress Questionnaire     Feeling of Stress : To some extent   Housing Stability: Unknown (6/10/2024)    Housing Stability Vital Sign     Unable to Pay for Housing in the Last Year: No   [2]   Current Outpatient Medications:     aspirin (ECOTRIN) 81 MG EC tablet, Take 1 tablet (81 mg total) by mouth once daily. For 4 weeks starting the day after surgery., Disp: , Rfl:     diclofenac (VOLTAREN) 75 MG EC tablet, Take 1 tablet (75 mg total) by mouth 2 (two) times daily., Disp: 30 tablet, Rfl: 1    EPINEPHrine (EPIPEN) 0.3 mg/0.3 mL AtIn, Inject 0.3 mLs (0.3 mg total) into the muscle as needed (Anaphylaxis)., Disp: 2 each, Rfl: 0    tiZANidine (ZANAFLEX) 4 MG tablet, TAKE 1 TABLET(4 MG) BY MOUTH EVERY 8 HOURS, Disp: 30 tablet, Rfl: 1    traZODone (DESYREL) 150 MG tablet, Take 1 tablet (150 mg total) by mouth every evening., Disp: 90 tablet, Rfl: 3    ibuprofen (ADVIL,MOTRIN) 200 MG tablet, Take 400 mg by mouth 2 (two) times daily as needed for Pain. (Patient not taking: Reported on 2/19/2025), Disp: , Rfl:

## 2025-04-10 DIAGNOSIS — M54.50 LUMBAR PAIN: ICD-10-CM

## 2025-04-10 DIAGNOSIS — M79.661 PAIN OF RIGHT CALF: ICD-10-CM

## 2025-04-10 RX ORDER — TRAZODONE HYDROCHLORIDE 150 MG/1
150 TABLET ORAL NIGHTLY
Qty: 90 TABLET | Refills: 2 | Status: SHIPPED | OUTPATIENT
Start: 2025-04-10 | End: 2026-04-10

## 2025-04-10 RX ORDER — DICLOFENAC SODIUM 75 MG/1
75 TABLET, DELAYED RELEASE ORAL 2 TIMES DAILY
Qty: 30 TABLET | Refills: 1 | Status: SHIPPED | OUTPATIENT
Start: 2025-04-10

## 2025-04-10 RX ORDER — TIZANIDINE 4 MG/1
4 TABLET ORAL EVERY 8 HOURS
Qty: 30 TABLET | Refills: 1 | Status: SHIPPED | OUTPATIENT
Start: 2025-04-10

## 2025-04-10 NOTE — TELEPHONE ENCOUNTER
Care Due:                  Date            Visit Type   Department     Provider  --------------------------------------------------------------------------------                                EP -                              PRIMARY      [facility] 2ND FLOOR  Last Visit: 01-      CARE ([facility])   FAMILY Gunjan Reed                              EP -                              PRIMARY      [facility] 2ND FLOOR  Next Visit: 04-      CARE ([facility])   FAMILY Gunjan Reed                                                            Last  Test          Frequency    Reason                     Performed    Due Date  --------------------------------------------------------------------------------    CBC.........  12 months..  diclofenac...............  06-   06-    Cr..........  12 months..  diclofenac...............  06-   06-    [facility] Embedded Care Due Messages. Reference number: [id number].   4/10/2025 3:05:46 PM CDT

## 2025-04-10 NOTE — TELEPHONE ENCOUNTER
Refill Routing Note   Medication(s) are not appropriate for processing by [facility] for the following reason(s):        Outside of protocol    [facility] action(s):  Route  Approve               Appointments  past 12m or future 3m with PCP    Date Provider   Last Visit   1/15/2025 Rachel Reed MD   Next Visit   4/30/2025 Rachel Reed MD   ED visits in past 90 days: 0        Note composed:3:40 PM 04/10/2025

## 2025-04-25 ENCOUNTER — PATIENT MESSAGE (OUTPATIENT)
Dept: ADMINISTRATIVE | Facility: HOSPITAL | Age: 50
End: 2025-04-25
Payer: COMMERCIAL

## 2025-04-30 ENCOUNTER — OFFICE VISIT (OUTPATIENT)
Dept: FAMILY MEDICINE | Facility: CLINIC | Age: 50
End: 2025-04-30
Payer: COMMERCIAL

## 2025-04-30 VITALS
BODY MASS INDEX: 21.89 KG/M2 | SYSTOLIC BLOOD PRESSURE: 114 MMHG | HEIGHT: 73 IN | WEIGHT: 165.19 LBS | DIASTOLIC BLOOD PRESSURE: 86 MMHG | OXYGEN SATURATION: 98 % | RESPIRATION RATE: 15 BRPM | HEART RATE: 72 BPM

## 2025-04-30 DIAGNOSIS — Z00.00 ANNUAL VISIT FOR GENERAL ADULT MEDICAL EXAMINATION WITHOUT ABNORMAL FINDINGS: ICD-10-CM

## 2025-04-30 DIAGNOSIS — Z13.220 ENCOUNTER FOR LIPID SCREENING FOR CARDIOVASCULAR DISEASE: ICD-10-CM

## 2025-04-30 DIAGNOSIS — Z13.29 THYROID DISORDER SCREEN: ICD-10-CM

## 2025-04-30 DIAGNOSIS — Z12.5 PROSTATE CANCER SCREENING: ICD-10-CM

## 2025-04-30 DIAGNOSIS — Z13.6 ENCOUNTER FOR LIPID SCREENING FOR CARDIOVASCULAR DISEASE: ICD-10-CM

## 2025-04-30 DIAGNOSIS — Z13.1 DIABETES MELLITUS SCREENING: Primary | ICD-10-CM

## 2025-04-30 PROCEDURE — 99999 PR PBB SHADOW E&M-EST. PATIENT-LVL III: CPT | Mod: PBBFAC,,, | Performed by: FAMILY MEDICINE

## 2025-04-30 NOTE — PROGRESS NOTES
Patient ID: Jaime Mcpherson is a 49 y.o. male.    Chief Complaint:  Routine medical exam    History of Present Illness    CHIEF COMPLAINT:  Jaime presents today for follow up    MUSCULOSKELETAL:  He reports history of back pain since January 2024 with significant improvement from daily stretching exercises. His elbow condition has improved following evaluation by Dr. Dinero.    LABS:  Last labs were performed in June. PSA test was conducted on May 4th.      ROS:  ROS as indicated in HPI.         Physical Exam  Constitutional:       Appearance: Normal appearance.   HENT:      Head: Normocephalic and atraumatic.      Nose: Nose normal.   Eyes:      Extraocular Movements: Extraocular movements intact.      Pupils: Pupils are equal, round, and reactive to light.   Cardiovascular:      Rate and Rhythm: Normal rate and regular rhythm.      Pulses: Normal pulses.   Pulmonary:      Effort: Pulmonary effort is normal. No respiratory distress.      Breath sounds: Normal breath sounds. No wheezing or rhonchi.   Musculoskeletal:         General: Normal range of motion.   Skin:     General: Skin is warm and dry.   Neurological:      General: No focal deficit present.      Mental Status: He is alert and oriented to person, place, and time.   Psychiatric:         Mood and Affect: Mood normal.         Behavior: Behavior normal.         Thought Content: Thought content normal.          Assessment & Plan    M54.50 Low back pain, unspecified  M25.50 Pain in unspecified joint    IMPRESSION:  - Reviewed recent history, noting resolution of back pain through stretching exercises.  - Assessed overall health status through exam.  - Determined vitamin D testing unnecessary due to adequate sun exposure and previous normal levels.  - Reclassified current visit as annual medical exam.    LOW BACK PAIN:  - Jaime presented with back pain in January.  - Evaluation shows improvement after implementing stretching exercises.  -  Recommend continuing daily stretching for ongoing management.    JOINT PAIN (ELBOW):  - Follow-up evaluation of patient's elbow condition shows significant improvement as reported by the patient.  - Confirmed previous consultation with Dr. Dinero for this condition.         Plan:          Diabetes mellitus screening  -     Hemoglobin A1C; Future; Expected date: 04/30/2025    Thyroid disorder screen  -     TSH; Future; Expected date: 04/30/2025    Prostate cancer screening  -     PSA, Screening; Future; Expected date: 04/30/2025    Encounter for lipid screening for cardiovascular disease  -     Lipid Panel; Future; Expected date: 04/30/2025    Annual visit for general adult medical examination without abnormal findings  -     Vitamin B12; Future; Expected date: 04/30/2025  -     Microalbumin/Creatinine Ratio, Urine; Future; Expected date: 04/30/2025  -     Lipid Panel; Future; Expected date: 04/30/2025  -     CBC Auto Differential; Future; Expected date: 04/30/2025  -     Comprehensive Metabolic Panel; Future; Expected date: 04/30/2025  -     Hemoglobin A1C; Future; Expected date: 04/30/2025  -     TSH; Future; Expected date: 04/30/2025  -     PSA, Screening; Future; Expected date: 04/30/2025        Follow up in about 1 year (around 4/30/2026), or if symptoms worsen or fail to improve.    This note was generated with the assistance of ambient listening technology. Verbal consent was obtained by the patient and accompanying visitor(s) for the recording of patient appointment to facilitate this note. I attest to having reviewed and edited the generated note for accuracy, though some syntax or spelling errors may persist. Please contact the author of this note for any clarification.

## 2025-06-17 DIAGNOSIS — M54.50 LUMBAR PAIN: ICD-10-CM

## 2025-06-17 DIAGNOSIS — M79.661 PAIN OF RIGHT CALF: ICD-10-CM

## 2025-06-17 RX ORDER — DICLOFENAC SODIUM 75 MG/1
75 TABLET, DELAYED RELEASE ORAL 2 TIMES DAILY
Qty: 30 TABLET | Refills: 1 | Status: SHIPPED | OUTPATIENT
Start: 2025-06-17

## 2025-06-17 NOTE — TELEPHONE ENCOUNTER
Care Due:                  Date            Visit Type   Department     Provider  --------------------------------------------------------------------------------                                EP -                              PRIMARY      [facility] 2ND FLOOR  Last Visit: 04-      CARE ([facility])   FAMILY Gunjan Reed                              EP -                              PRIMARY      [facility] 2ND FLOOR  Next Visit: 10-      CARE ([facility])   FAMILY Gunjan Reed                                                            Last  Test          Frequency    Reason                     Performed    Due Date  --------------------------------------------------------------------------------    CBC.........  12 months..  diclofenac...............  06-   06-    Cr..........  12 months..  diclofenac...............  06-   06-    [facility] Embedded Care Due Messages. Reference number: [id number].   6/17/2025 8:08:29 AM CDT

## 2025-06-22 DIAGNOSIS — M54.50 LUMBAR PAIN: ICD-10-CM

## 2025-06-22 DIAGNOSIS — M79.661 PAIN OF RIGHT CALF: ICD-10-CM

## 2025-06-22 NOTE — TELEPHONE ENCOUNTER
No care due was identified.  [facility] Embedded Care Due Messages. Reference number: [id number].   6/22/2025 9:59:10 AM CDT

## 2025-06-23 RX ORDER — TIZANIDINE 4 MG/1
4 TABLET ORAL EVERY 8 HOURS
Qty: 30 TABLET | Refills: 1 | Status: SHIPPED | OUTPATIENT
Start: 2025-06-23

## 2025-07-09 ENCOUNTER — PATIENT MESSAGE (OUTPATIENT)
Dept: ADMINISTRATIVE | Facility: HOSPITAL | Age: 50
End: 2025-07-09
Payer: COMMERCIAL

## 2025-08-19 ENCOUNTER — PATIENT MESSAGE (OUTPATIENT)
Dept: ADMINISTRATIVE | Facility: HOSPITAL | Age: 50
End: 2025-08-19
Payer: COMMERCIAL

## (undated) DEVICE — GLOVE BIOGEL SKINSENSE PI 7.0

## (undated) DEVICE — SLING ARM LARGE FOAM STRAP

## (undated) DEVICE — DRAPE INCISE IOBAN 2 23X17IN

## (undated) DEVICE — SOL NACL IRR 3000ML

## (undated) DEVICE — DRAPE STERI INSTRUMENT 1018

## (undated) DEVICE — PAD ELECTRODE STER 1.5X3

## (undated) DEVICE — DRAPE SURG W/TWL 17 5/8X23

## (undated) DEVICE — SUT MCRYL PLUS 4-0 PS2 27IN

## (undated) DEVICE — DRESSING XEROFORM NONADH 1X8IN

## (undated) DEVICE — ADHESIVE MASTISOL VIAL 48/BX

## (undated) DEVICE — GLOVE ORTHO PF SZ 8.5

## (undated) DEVICE — BNDG COFLEX FOAM LF2 ST 6X5YD

## (undated) DEVICE — UNDERGLOVES BIOGEL PI SIZE 7.5

## (undated) DEVICE — SPONGE COTTON TRAY 4X4IN

## (undated) DEVICE — TOURNIQUET SB QC DP 18X4IN

## (undated) DEVICE — Device

## (undated) DEVICE — APPLICATOR CHLORAPREP ORN 26ML

## (undated) DEVICE — BANDAGE MATRIX HK LOOP 4IN 5YD

## (undated) DEVICE — SOL NACL IRR 1000ML BTL

## (undated) DEVICE — GLOVE SURGEON SYN PF SZ 9

## (undated) DEVICE — CLOSURE SKIN STERI STRIP 1/2X4

## (undated) DEVICE — COVER LIGHT HANDLE 80/CA

## (undated) DEVICE — PAD CAST SPECIALIST STRL 4

## (undated) DEVICE — SUT VICRYL PLUS 3-0 SH 18IN

## (undated) DEVICE — GOWN ECLIPSE REINF LV4 TWL 2XL

## (undated) DEVICE — PAD ABDOMINAL STERILE 8X10IN

## (undated) DEVICE — COVER CAMERA OPERATING ROOM

## (undated) DEVICE — DRAPE THREE-QTR REINF 53X77IN

## (undated) DEVICE — DRAPE ARTHSCP FLD CTRL POUCH

## (undated) DEVICE — SUT VICRYL PLUS 0 CT1 18IN

## (undated) DEVICE — BNDG COFLEX FOAM LF2 ST 4X5YD

## (undated) DEVICE — IMPLANTABLE DEVICE
Type: IMPLANTABLE DEVICE | Site: ELBOW | Status: NON-FUNCTIONAL
Removed: 2024-08-13

## (undated) DEVICE — SUT 4/0 18IN ETHILON BL P3

## (undated) DEVICE — SUT VICRYL 3-0 27 SH

## (undated) DEVICE — FORCEP STRAIGHT DISP

## (undated) DEVICE — DRAPE U SPLIT SHEET 54X76IN

## (undated) DEVICE — YANKAUER OPEN TIP W/O VENT